# Patient Record
Sex: FEMALE | Race: WHITE | NOT HISPANIC OR LATINO | Employment: PART TIME | URBAN - METROPOLITAN AREA
[De-identification: names, ages, dates, MRNs, and addresses within clinical notes are randomized per-mention and may not be internally consistent; named-entity substitution may affect disease eponyms.]

---

## 2019-02-15 ENCOUNTER — CLINICAL SUPPORT (OUTPATIENT)
Dept: URGENT CARE | Facility: CLINIC | Age: 19
End: 2019-02-15
Payer: COMMERCIAL

## 2019-02-15 VITALS
DIASTOLIC BLOOD PRESSURE: 84 MMHG | BODY MASS INDEX: 30.76 KG/M2 | TEMPERATURE: 98 F | SYSTOLIC BLOOD PRESSURE: 121 MMHG | OXYGEN SATURATION: 99 % | RESPIRATION RATE: 12 BRPM | HEART RATE: 82 BPM | WEIGHT: 182 LBS

## 2019-02-15 DIAGNOSIS — H10.9 CONJUNCTIVITIS OF LEFT EYE, UNSPECIFIED CONJUNCTIVITIS TYPE: ICD-10-CM

## 2019-02-15 DIAGNOSIS — J06.9 UPPER RESPIRATORY TRACT INFECTION, UNSPECIFIED TYPE: ICD-10-CM

## 2019-02-15 DIAGNOSIS — J01.90 ACUTE NON-RECURRENT SINUSITIS, UNSPECIFIED LOCATION: Primary | ICD-10-CM

## 2019-02-15 PROCEDURE — 99204 PR OFFICE/OUTPT VISIT, NEW, LEVL IV, 45-59 MIN: ICD-10-PCS | Mod: S$GLB,,, | Performed by: NURSE PRACTITIONER

## 2019-02-15 PROCEDURE — 99204 OFFICE O/P NEW MOD 45 MIN: CPT | Mod: S$GLB,,, | Performed by: NURSE PRACTITIONER

## 2019-02-15 RX ORDER — PREDNISONE 20 MG/1
20 TABLET ORAL 2 TIMES DAILY
Qty: 10 TABLET | Refills: 0 | Status: SHIPPED | OUTPATIENT
Start: 2019-02-15 | End: 2019-02-20

## 2019-02-15 RX ORDER — PROMETHAZINE HYDROCHLORIDE AND DEXTROMETHORPHAN HYDROBROMIDE 6.25; 15 MG/5ML; MG/5ML
5 SYRUP ORAL EVERY 4 HOURS PRN
Qty: 240 ML | Refills: 0 | Status: SHIPPED | OUTPATIENT
Start: 2019-02-15 | End: 2019-02-25

## 2019-02-15 RX ORDER — FLUTICASONE PROPIONATE 50 MCG
2 SPRAY, SUSPENSION (ML) NASAL 2 TIMES DAILY
Qty: 1 BOTTLE | Refills: 0 | Status: SHIPPED | OUTPATIENT
Start: 2019-02-15 | End: 2020-03-02 | Stop reason: SDUPTHER

## 2019-02-15 RX ORDER — AZITHROMYCIN 250 MG/1
TABLET, FILM COATED ORAL
Qty: 6 TABLET | Refills: 0 | Status: SHIPPED | OUTPATIENT
Start: 2019-02-15 | End: 2019-09-06

## 2019-02-15 RX ORDER — POLYMYXIN B SULFATE AND TRIMETHOPRIM 1; 10000 MG/ML; [USP'U]/ML
1 SOLUTION OPHTHALMIC EVERY 6 HOURS
Qty: 10 ML | Refills: 0 | Status: SHIPPED | OUTPATIENT
Start: 2019-02-15 | End: 2019-09-06

## 2019-02-15 RX ORDER — CETIRIZINE HYDROCHLORIDE 10 MG/1
10 TABLET ORAL DAILY
Qty: 30 TABLET | Refills: 2 | Status: SHIPPED | OUTPATIENT
Start: 2019-02-15 | End: 2020-03-02 | Stop reason: SDUPTHER

## 2019-02-15 RX ORDER — BENZONATATE 100 MG/1
100 CAPSULE ORAL 3 TIMES DAILY PRN
Qty: 30 CAPSULE | Refills: 1 | Status: SHIPPED | OUTPATIENT
Start: 2019-02-15 | End: 2019-09-06

## 2019-02-15 RX ORDER — OLOPATADINE HYDROCHLORIDE 1 MG/ML
1 SOLUTION/ DROPS OPHTHALMIC 2 TIMES DAILY
Qty: 5 ML | Refills: 1 | Status: SHIPPED | OUTPATIENT
Start: 2019-02-15 | End: 2020-02-15

## 2019-02-15 NOTE — PROGRESS NOTES
Subjective:       Patient ID: Morena Paige is a 19 y.o. female.    Vitals:  weight is 82.6 kg (182 lb). Her oral temperature is 98.4 °F (36.9 °C). Her blood pressure is 121/84 and her pulse is 82. Her respiration is 12 and oxygen saturation is 99%.     Chief Complaint: Eye Problem    Eye Problem    The left eye is affected. This is a new problem. The current episode started yesterday. The problem occurs constantly. The pain is at a severity of 4/10. The patient is experiencing no pain. There is no known exposure to pink eye. Associated symptoms include an eye discharge and eye redness. Pertinent negatives include no blurred vision, double vision, fever, nausea or vomiting. Associated symptoms comments: Swelling, congestion ,and productive cough ( yellow) . She has tried nothing for the symptoms. The treatment provided no relief.       Constitution: Positive for fatigue. Negative for chills and fever.   HENT: Positive for congestion, postnasal drip, sinus pain, sinus pressure and sore throat.    Neck: Negative for painful lymph nodes.   Cardiovascular: Negative for chest pain and leg swelling.   Eyes: Positive for eye discharge and eye redness. Negative for double vision and blurred vision.   Respiratory: Positive for cough. Negative for shortness of breath.    Gastrointestinal: Negative for nausea, vomiting and diarrhea.   Genitourinary: Negative for dysuria, frequency, urgency and history of kidney stones.   Musculoskeletal: Negative for joint pain, joint swelling, muscle cramps and muscle ache.   Skin: Negative for color change, pale, rash and bruising.   Allergic/Immunologic: Negative for seasonal allergies.   Neurological: Negative for dizziness, history of vertigo, light-headedness, passing out and headaches.   Hematologic/Lymphatic: Negative for swollen lymph nodes.   Psychiatric/Behavioral: Negative for nervous/anxious, sleep disturbance and depression. The patient is not nervous/anxious.        Objective:       Physical Exam   Constitutional: She is oriented to person, place, and time. Vital signs are normal. She appears well-developed and well-nourished. She is cooperative.   HENT:   Head: Normocephalic.   Right Ear: Hearing, external ear and ear canal normal. A middle ear effusion is present.   Left Ear: Hearing, external ear and ear canal normal. A middle ear effusion is present.   Nose: Mucosal edema and rhinorrhea present. Right sinus exhibits maxillary sinus tenderness. Left sinus exhibits maxillary sinus tenderness.   Mouth/Throat: Uvula is midline and mucous membranes are normal. Posterior oropharyngeal erythema present.   Eyes: EOM and lids are normal. Pupils are equal, round, and reactive to light. Left conjunctiva is injected.   Neck: Trachea normal, normal range of motion, full passive range of motion without pain and phonation normal. Neck supple.   Cardiovascular: Normal rate, regular rhythm, normal heart sounds, intact distal pulses and normal pulses.   Pulmonary/Chest: Effort normal and breath sounds normal.   Abdominal: Soft. Normal appearance, normal aorta and bowel sounds are normal. There is no tenderness.   Musculoskeletal: Normal range of motion.   Neurological: She is alert and oriented to person, place, and time. She has normal strength. GCS eye subscore is 4. GCS verbal subscore is 5. GCS motor subscore is 6.   Skin: Skin is warm, dry and intact. Capillary refill takes less than 2 seconds.   Psychiatric: She has a normal mood and affect. Her speech is normal and behavior is normal. Judgment and thought content normal. Cognition and memory are normal.       Assessment:       1. Acute non-recurrent sinusitis, unspecified location    2. Upper respiratory tract infection, unspecified type    3. Conjunctivitis of left eye, unspecified conjunctivitis type        Plan:         Acute non-recurrent sinusitis, unspecified location    Upper respiratory tract infection, unspecified type    Conjunctivitis  of left eye, unspecified conjunctivitis type    Other orders  -     olopatadine (PATANOL) 0.1 % ophthalmic solution; Place 1 drop into both eyes 2 (two) times daily.  Dispense: 5 mL; Refill: 1  -     polymyxin B sulf-trimethoprim (POLYTRIM) 10,000 unit- 1 mg/mL Drop; Place 1 drop into the left eye every 6 (six) hours.  Dispense: 10 mL; Refill: 0  -     cetirizine (ZYRTEC) 10 MG tablet; Take 1 tablet (10 mg total) by mouth once daily.  Dispense: 30 tablet; Refill: 2  -     fluticasone (FLONASE) 50 mcg/actuation nasal spray; 2 sprays (100 mcg total) by Each Nare route 2 (two) times daily.  Dispense: 1 Bottle; Refill: 0  -     predniSONE (DELTASONE) 20 MG tablet; Take 1 tablet (20 mg total) by mouth 2 (two) times daily. for 5 days  Dispense: 10 tablet; Refill: 0  -     promethazine-dextromethorphan (PROMETHAZINE-DM) 6.25-15 mg/5 mL Syrp; Take 5 mLs by mouth every 4 (four) hours as needed.  Dispense: 240 mL; Refill: 0  -     benzonatate (TESSALON PERLES) 100 MG capsule; Take 1 capsule (100 mg total) by mouth 3 (three) times daily as needed for Cough.  Dispense: 30 capsule; Refill: 1  -     azithromycin (Z-ALEKS) 250 MG tablet; Take two tablets on day one by mouth, then one tablet by mouth on days 2-5  Dispense: 6 tablet; Refill: 0

## 2019-04-17 PROBLEM — Z34.90 ENCOUNTER FOR INDUCTION OF LABOR: Status: ACTIVE | Noted: 2019-04-17

## 2019-09-06 ENCOUNTER — OFFICE VISIT (OUTPATIENT)
Dept: FAMILY MEDICINE | Facility: CLINIC | Age: 19
End: 2019-09-06
Payer: COMMERCIAL

## 2019-09-06 VITALS
SYSTOLIC BLOOD PRESSURE: 118 MMHG | OXYGEN SATURATION: 98 % | TEMPERATURE: 98 F | WEIGHT: 188.5 LBS | BODY MASS INDEX: 28.57 KG/M2 | HEIGHT: 68 IN | DIASTOLIC BLOOD PRESSURE: 74 MMHG | HEART RATE: 62 BPM

## 2019-09-06 DIAGNOSIS — Z00.00 ANNUAL PHYSICAL EXAM: Primary | ICD-10-CM

## 2019-09-06 DIAGNOSIS — Z28.39 IMMUNIZATION DEFICIENCY: ICD-10-CM

## 2019-09-06 DIAGNOSIS — Z12.4 CERVICAL CANCER SCREENING: ICD-10-CM

## 2019-09-06 DIAGNOSIS — Z76.89 ENCOUNTER TO ESTABLISH CARE: ICD-10-CM

## 2019-09-06 PROBLEM — Z34.90 ENCOUNTER FOR INDUCTION OF LABOR: Status: RESOLVED | Noted: 2019-04-17 | Resolved: 2019-09-06

## 2019-09-06 PROCEDURE — 99385 PREV VISIT NEW AGE 18-39: CPT | Mod: 25,S$GLB,, | Performed by: NURSE PRACTITIONER

## 2019-09-06 PROCEDURE — 99385 PR PREVENTIVE VISIT,NEW,18-39: ICD-10-PCS | Mod: 25,S$GLB,, | Performed by: NURSE PRACTITIONER

## 2019-09-06 PROCEDURE — 90471 TDAP VACCINE GREATER THAN OR EQUAL TO 7YO IM: ICD-10-PCS | Mod: S$GLB,,, | Performed by: NURSE PRACTITIONER

## 2019-09-06 PROCEDURE — 90471 IMMUNIZATION ADMIN: CPT | Mod: S$GLB,,, | Performed by: NURSE PRACTITIONER

## 2019-09-06 PROCEDURE — 90715 TDAP VACCINE GREATER THAN OR EQUAL TO 7YO IM: ICD-10-PCS | Mod: S$GLB,,, | Performed by: NURSE PRACTITIONER

## 2019-09-06 PROCEDURE — 90472 IMMUNIZATION ADMIN EACH ADD: CPT | Mod: S$GLB,,, | Performed by: NURSE PRACTITIONER

## 2019-09-06 PROCEDURE — 90715 TDAP VACCINE 7 YRS/> IM: CPT | Mod: S$GLB,,, | Performed by: NURSE PRACTITIONER

## 2019-09-06 PROCEDURE — 90472 MENINGOCOCCAL CONJUGATE VACCINE 4-VALENT IM (MENACTRA): ICD-10-PCS | Mod: S$GLB,,, | Performed by: NURSE PRACTITIONER

## 2019-09-06 PROCEDURE — 90734 MENACWYD/MENACWYCRM VACC IM: CPT | Mod: S$GLB,,, | Performed by: NURSE PRACTITIONER

## 2019-09-06 PROCEDURE — 99999 PR PBB SHADOW E&M-EST. PATIENT-LVL III: CPT | Mod: PBBFAC,,, | Performed by: NURSE PRACTITIONER

## 2019-09-06 PROCEDURE — 99999 PR PBB SHADOW E&M-EST. PATIENT-LVL III: ICD-10-PCS | Mod: PBBFAC,,, | Performed by: NURSE PRACTITIONER

## 2019-09-06 PROCEDURE — 90734 MENINGOCOCCAL CONJUGATE VACCINE 4-VALENT IM (MENACTRA): ICD-10-PCS | Mod: S$GLB,,, | Performed by: NURSE PRACTITIONER

## 2019-09-06 NOTE — PROGRESS NOTES
Patient verified by name and . Patient received tDap in left deltoid and Menactra meningococcal meningitis in right deltoid. Patient tolerated injection well. Patient advised to wait in clinic for 15 minutes in case of adverse reactions. Patient demonstrated understanding.

## 2019-09-06 NOTE — PROGRESS NOTES
Subjective:       Patient ID: Morena Paige is a 19 y.o. female.    Chief Complaint: Immunizations    Ms. Paige is a 18 yo female patient who presents today to establish care and to discuss needed vaccines for school. Last saw MD in Pediatrics, Dr. Castillo. She is studying elementary education at [employer], needs Tdap and Meningococcal vaccines. She is otherwise feeling well. No complaints today. Hx of cyclical vomiting syndrome, not currently experiencing.     Medical/Surgical/Family/Social history and medications reviewed, updated.     Requesting referral to new GYN. Took birth control over the last year, caused weight gain. Approx 155 about a year ago. Works as a , but no regular exercise when she is off. No diet changes. Vitals stable.     Patient Active Problem List   Diagnosis    Cyclic vomiting syndrome    Encounter for induction of labor       Current Outpatient Medications:     cetirizine (ZYRTEC) 10 MG tablet, Take 1 tablet (10 mg total) by mouth once daily., Disp: 30 tablet, Rfl: 2    fluticasone (FLONASE) 50 mcg/actuation nasal spray, 2 sprays (100 mcg total) by Each Nare route 2 (two) times daily., Disp: 1 Bottle, Rfl: 0    olopatadine (PATANOL) 0.1 % ophthalmic solution, Place 1 drop into both eyes 2 (two) times daily., Disp: 5 mL, Rfl: 1    desonide (DESOWEN) 0.05 % cream, Apply to affected area 2 times daily, Disp: 60 g, Rfl: 1    Review of Systems   Constitutional: Positive for unexpected weight change. Negative for activity change.   HENT: Negative for hearing loss, rhinorrhea and trouble swallowing.    Eyes: Negative for discharge and visual disturbance.   Respiratory: Negative for chest tightness and wheezing.    Cardiovascular: Negative for chest pain and palpitations.   Gastrointestinal: Negative for blood in stool, constipation, diarrhea and vomiting.   Endocrine: Negative for polydipsia and polyuria.   Genitourinary: Negative for difficulty urinating, dysuria, hematuria and  menstrual problem.   Musculoskeletal: Negative for arthralgias, joint swelling and neck pain.   Neurological: Negative for weakness and headaches.   Psychiatric/Behavioral: Negative for confusion and dysphoric mood.       Objective:      Physical Exam   Constitutional: She is oriented to person, place, and time. Vital signs are normal. She appears well-developed and well-nourished. No distress.   HENT:   Right Ear: Tympanic membrane, external ear and ear canal normal.   Left Ear: Tympanic membrane, external ear and ear canal normal.   Nose: No mucosal edema.   Mouth/Throat: Oropharynx is clear and moist.   Eyes: Pupils are equal, round, and reactive to light. EOM are normal.   Neck: Neck supple. No thyromegaly present.   Cardiovascular: Normal rate, regular rhythm and normal heart sounds.   Pulmonary/Chest: Effort normal and breath sounds normal. She has no wheezes.   Abdominal: Soft. Normal appearance and bowel sounds are normal. There is no tenderness.   Musculoskeletal: She exhibits no edema.   Neurological: She is alert and oriented to person, place, and time.   Skin: Skin is warm and dry.   Psychiatric: She has a normal mood and affect.   Nursing note and vitals reviewed.      Assessment:       1. Annual physical exam    2. Encounter to establish care    3. Immunization deficiency    4. Cervical cancer screening    5. BMI 28.0-28.9,adult        Plan:       Morena was seen today for immunizations.    Diagnoses and all orders for this visit:    Annual physical exam  -     CBC auto differential; Future  -     Comprehensive metabolic panel; Future  -     Lipid panel; Future  Discussed health maintenance guidelines appropriate for age.    Screen and treat as needed    Encounter to establish care  Patient to return in 1 year to establish with PCP    Immunization deficiency  -     (In Office Administered) Tdap Vaccine  -     (In Office Administered) Meningococcal Conjugate - MCV4P (MENACTRA)  Immunize today.  Counseled  "patient on risks, benefits and side effects.  Patient elected to proceed with vaccination.    Cervical cancer screening  -     Ambulatory referral to Obstetrics / Gynecology    BMI 28.0-28.9,adult  Declines weight loss workup today, believes it was related to hormonal contraceptives  Counseled patient on his ideal body weight, health consequences of being obese and current recommendations including weekly exercise and a heart healthy diet.  Current BMI is:Estimated body mass index is 28.66 kg/m² as calculated from the following:    Height as of this encounter: 5' 8" (1.727 m).    Weight as of this encounter: 85.5 kg (188 lb 7.9 oz)..  Patient is aware that ideal BMI < 25 or Weight in (lb) to have BMI = 25: 164.1.          "

## 2020-03-02 ENCOUNTER — CLINICAL SUPPORT (OUTPATIENT)
Dept: URGENT CARE | Facility: CLINIC | Age: 20
End: 2020-03-02
Payer: COMMERCIAL

## 2020-03-02 VITALS
TEMPERATURE: 97 F | SYSTOLIC BLOOD PRESSURE: 114 MMHG | WEIGHT: 189 LBS | DIASTOLIC BLOOD PRESSURE: 77 MMHG | HEART RATE: 79 BPM | HEIGHT: 64 IN | OXYGEN SATURATION: 98 % | BODY MASS INDEX: 32.27 KG/M2

## 2020-03-02 DIAGNOSIS — R19.7 DIARRHEA, UNSPECIFIED TYPE: ICD-10-CM

## 2020-03-02 DIAGNOSIS — J06.9 UPPER RESPIRATORY TRACT INFECTION, UNSPECIFIED TYPE: Primary | ICD-10-CM

## 2020-03-02 PROCEDURE — 99214 PR OFFICE/OUTPT VISIT, EST, LEVL IV, 30-39 MIN: ICD-10-PCS | Mod: S$GLB,,, | Performed by: NURSE PRACTITIONER

## 2020-03-02 PROCEDURE — 99214 OFFICE O/P EST MOD 30 MIN: CPT | Mod: S$GLB,,, | Performed by: NURSE PRACTITIONER

## 2020-03-02 RX ORDER — CETIRIZINE HYDROCHLORIDE 10 MG/1
10 TABLET ORAL DAILY
Qty: 90 TABLET | Refills: 3 | Status: SHIPPED | OUTPATIENT
Start: 2020-03-02 | End: 2021-03-02

## 2020-03-02 RX ORDER — GUAIFENESIN 1200 MG/1
1200 TABLET, EXTENDED RELEASE ORAL 2 TIMES DAILY
Qty: 20 TABLET | Refills: 0 | Status: SHIPPED | OUTPATIENT
Start: 2020-03-02 | End: 2020-03-12

## 2020-03-02 RX ORDER — METHYLPREDNISOLONE 4 MG/1
TABLET ORAL
Qty: 1 PACKAGE | Refills: 0 | Status: SHIPPED | OUTPATIENT
Start: 2020-03-02 | End: 2020-03-23

## 2020-03-02 RX ORDER — FLUTICASONE PROPIONATE 50 MCG
2 SPRAY, SUSPENSION (ML) NASAL 2 TIMES DAILY
Qty: 16 G | Refills: 1 | Status: SHIPPED | OUTPATIENT
Start: 2020-03-02

## 2020-03-02 NOTE — PATIENT INSTRUCTIONS
Uncertain Causes of Diarrhea (Adult)    Diarrhea is when stools are loose and watery. This can be caused by:  · Viral infections  · Bacterial infections  · Food poisoning  · Parasites  · Irritable bowel syndrome (IBS)  · Inflammatory bowel diseases such as ulcerative colitis, Crohn's disease, and celiac disease  · Food intolerance, such as to lactose, the sugar found in milk and milk products  · Reaction to medicines like antibiotics, laxatives, cancer drugs, and antacids  Along with diarrhea, you may also have:  · Abdominal pain and cramping  · Nausea and vomiting  · Loss of bowel control  · Fever and chills  · Bloody stools  In some cases, antibiotics may help to treat diarrhea. You may have a stool sample test. This is done to see what is causing your diarrhea, and if antibiotics will help treat it. The results of a stool sample test may take up to 2 days. The healthcare provider may not give you antibiotics until he or she has the stool test results.  Diarrhea can cause dehydration. This is the loss of too much water and other fluids from the body. When this occurs, body fluid must be replaced. This can be done with oral rehydration solutions. Oral rehydration solutions are available at drugstores and grocery stores without a prescription.  Home care  Follow all instructions given by your healthcare provider. Rest at home for the next 24 hours, or until you feel better. Avoid caffeine, tobacco, and alcohol. These can make diarrhea, cramping, and pain worse.  If taking medicines:  · Dont take over-the-counter diarrhea or nausea medicines unless your healthcare provider tells you to.  · You may use acetaminophen or NSAID medicines like ibuprofen or naproxen to reduce pain and fever. Dont use these if you have chronic liver or kidney disease, or ever had a stomach ulcer or gastrointestinal bleeding. Don't use NSAID medicines if you are already taking one for another condition (like arthritis) or are on daily  aspirin therapy (such as for heart disease or after a stroke). Talk with your healthcare provider first.  · If antibiotics were prescribed, be sure you take them until they are finished. Dont stop taking them even when you feel better. Antibiotics must be taken as a full course.  To prevent the spread of illness:  · Remember that washing with soap and water and using alcohol-based  is the best way to prevent the spread of infection.  · Clean the toilet after each use.  · Wash your hands before eating.  · Wash your hands before and after preparing food. Keep in mind that people with diarrhea or vomiting should not prepare food for others.  · Wash your hands after using cutting boards, countertops, and knives that have been in contact with raw foods.  · Wash and then peel fruits and vegetables.  · Keep uncooked meats away from cooked and ready-to-eat foods.  · Use a food thermometer when cooking. Cook poultry to at least 165°F (74°C). Cook ground meat (beef, veal, pork, lamb) to at least 160°F (71°C). Cook fresh beef, veal, lamb, and pork to at least 145°F (63°C).  · Dont eat raw or undercooked eggs (poached or adele side up), poultry, meat, or unpasteurized milk and juices.  Food and drinks  The main goal while treating vomiting or diarrhea is to prevent dehydration. This is done by taking small amounts of liquids often.  · Keep in mind that liquids are more important than food right now.  · Drink only small amounts of liquids at a time.  · Dont force yourself to eat, especially if you are having cramping, vomiting, or diarrhea. Dont eat large amounts at a time, even if you are hungry.  · If you eat, avoid fatty, greasy, spicy, or fried foods.  · Dont eat dairy foods or drink milk if you have diarrhea. These can make diarrhea worse.  During the first 24 hours you can try:  · Oral rehydration solutions. Do not use sports drinks. They have too much sugar and not enough electrolytes.  · Soft drinks without  caffeine  · Ginger ale  · Water (plain or flavored)  · Decaf tea or coffee  · Clear broth, consommé, or bouillon  · Gelatin, popsicles, or frozen fruit juice bars  The second 24 hours, if you are feeling better, you can add:  · Hot cereal, plain toast, bread, rolls, or crackers  · Plain noodles, rice, mashed potatoes, chicken noodle soup, or rice soup  · Unsweetened canned fruit (no pineapple)  · Bananas  As you recover:  · Limit fat intake to less than 15 grams per day. Dont eat margarine, butter, oils, mayonnaise, sauces, gravies, fried foods, peanut butter, meat, poultry, or fish.  · Limit fiber. Dont eat raw or cooked vegetables, fresh fruits except bananas, or bran cereals.  · Limit caffeine and chocolate.  · Limit dairy.  · Dont use spices or seasonings except salt.  · Go back to your normal diet over time, as you feel better and your symptoms improve.  · If the symptoms come back, go back to a simple diet or clear liquids.  Follow-up care  Follow up with your healthcare provider, or as advised. If a stool sample was taken or cultures were done, call the healthcare provider for the results as instructed.  Call 911  Call 911 if you have any of these symptoms:  · Trouble breathing  · Confusion  · Extreme drowsiness or trouble walking  · Loss of consciousness  · Rapid heart rate  · Chest pain  · Stiff neck  · Seizure  When to seek medical advice  Call your healthcare provider right away if any of these occur:  · Abdominal pain that gets worse  · Constant lower right abdominal pain  · Continued vomiting and inability to keep liquids down  · Diarrhea more than 5 times a day  · Blood in vomit or stool  · Dark urine or no urine for 8 hours, dry mouth and tongue, tiredness, weakness, or dizziness  · Drowsiness  · New rash  · You dont get better in 2 to 3 days  · Fever of 100.4°F (38°C) or higher that doesnt get lower with medicine  Date Last Reviewed: 1/3/2016  © 3163-6641 [employer]. [address] [place] [address]  [place] [address]. All rights reserved. This information is not intended as a substitute for professional medical care. Always follow your healthcare professional's instructions.        Low-Fiber Diet     Eggs are high in protein and easy to digest.     Eating a low-fiber diet means eating foods that dont have much fiber. These foods are easy to digest.  Most of the fiber that you eat passes undigested through your bowel. This is what forms stool. Low-fiber foods can help to slow down your bowel movements. When you eat a low-fiber diet, you have fewer stools. This lets your intestine rest.  Your healthcare provider will tell you how long you need to be on this diet. It may only be for a short time. Low-fiber foods often don't give you all the nutrients you need to keep healthy. Your provider may have you take certain vitamins while you are on this diet.  Reasons to eat a low-fiber diet  The goal of a low-fiber diet is to limit the size and number of your stools. It may be prescribed if you:  · Are having chemotherapy or radiation treatments  · Have had intestinal surgery  · Have a condition that affects your intestine, such as irritable bowel syndrome, Crohns disease, ulcerative colitis, or diverticulitis  General guidelines for a low-fiber diet  In general, a low-fiber diet means having fewer than 13 grams of fiber a day. Your provider may give you a list of things you can and cant eat or drink. Read food labels. Choose foods and drinks that have as close to zero grams of fiber as possible. Here are general guidelines to follow:  Breads, pasta, cereal, rice, and other starches (6 to 11 servings daily)  · What to choose: white bread, biscuits, muffins, and white rolls; plain crackers; waffles; white pasta; white rice; cream of wheat; grits; white pancakes; corn flakes; cooked potatoes without skin.  Fiber content of these foods should be less than 0.5 (1/2) gram per serving.  · What to avoid:  whole-wheat or whole-grain breads, crackers, and pasta; breads with seeds or nuts; wheat germ; darlene crackers; cornbread; wild or brown rice; whole-grain, bran, and granola cereals; cereals with seeds, nuts, coconut, or dried fruit; potatoes with skin  Milk and dairy (2 servings daily)  · What to choose: milk, buttermilk; yogurt or ice cream without seeds or nuts; custard or pudding; sour cream; cheese and cottage cheese  · What to avoid: ice cream and yogurt with seeds, nuts, or fruit chunks  Fruit (2 to 4 servings daily)  · What to choose: ripe banana; ripe nectarine, peach, apricot, papaya, and plum; soft honeydew melon and cantaloupe; cooked or canned fruit without skin or seeds (not sweetened with sorbitol); applesauce; strained fruit juice (without pulp)  · What to avoid: raw or dried fruit; all berries; raisins; canned and raw pineapple; prunes and prune juice; fruit juice with pulp  Vegetables (3 to 5 servings daily)  · What to choose: well-cooked or canned vegetables without seeds, such as spinach, eggplant, green and wax beans, carrots, yellow squash, pumpkin; lettuce on a sandwich  · What to avoid: all raw or steamed vegetables; vegetables with seeds, such as unstrained tomato sauce; green peas; lima beans; broccoli; corn; parsnips  Meats and protein (4 to 6 ounces daily)  · What to choose: tender, well-cooked meat, including ground meat, poultry, and fish; eggs; tofu; creamy peanut butter  · What to avoid: tough, chewy meat with gristle; peas, including split, yellow, and black-eyed; beans, including navy, lima, black, garbanzo, soy, bonilla, and lentil; peanuts and crunchy peanut butter   Fats, oils, sauces, condiments (fewer than 8 teaspoons daily)  · What to choose: butter, magarine, oils, whipped cream, sour cream, mayonnaise, smooth dressings and sauces; plain gravy; smooth condiments  · What to avoid: dressing with seeds or fruit chunks; pickles and relishes  Other foods and drinks  · What to  choose: water; plain gelatin; plain puddings; pretzels; plain cookies and cakes; honey, syrup; decaffeinated drinks, including tea and coffee    · What to avoid: popcorn; potato chips; spicy foods; fried, greasy foods; alcohol (ask your provider); marmalade, jam, and preserves; desserts that have seeds, nuts, coconut, dried fruit, whole grains or bran; candy that has seeds or nuts; drinks sweetened with sorbitol or other sugar substitutes; caffeinated drinks, including tea, coffee, soda, and energy drinks  Date Last Reviewed: 6/18/2015 © 2000-2017 [employer]. [address] [place] [address], [place] [address]. All rights reserved. This information is not intended as a substitute for professional medical care. Always follow your healthcare professional's instructions.        Treating Diarrhea    Diarrhea happens when you have loose, watery, or frequent bowel movements. It is a common problem with many causes. Most cases of diarrhea clear up on their own. But certain cases may need treatment. Be sure to see your healthcare provider if your symptoms do not improve within a few days.  Getting relief  Treatment of diarrhea depends on its cause. Diarrhea caused by bacterial or parasite infection is often treated with antibiotics. Diarrhea caused by other factors, such as a stomach virus, often improves with simple home treatment. The tips below may also help relieve your symptoms.  · Drink plenty of fluids. This helps prevent too much fluid loss (dehydration). Water, clear soups, and electrolyte solutions are good choices. Avoid alcohol, coffee, tea, and milk. These can irritate your intestines and make symptoms worse.  · Suck on ice chips if drinking makes you queasy.  · Return to your normal diet slowly. You may want to eat bland foods at first, such as rice and toast. Also, you may need to avoid certain foods for a while, such as dairy products. These can make symptoms worse. Ask your healthcare provider if  there are any other foods you should avoid.  · If you were prescribed antibiotics, take them as directed.  · Do not take anti-diarrhea medicines without asking your healthcare provider first.  Call your healthcare provider   Call your healthcare provider if you have any of the following:   · A fever of 100.4°F (38.0°C) or higher, or as directed by your healthcare provider  · Severe pain  · Worsening diarrhea or diarrhea for more than 2 days  · Bloody vomit or stool  · Signs of dehydration (dizziness, dry mouth and tongue, rapid pulse, dark urine)  Date Last Reviewed: 7/1/2016  © 1167-0098 [employer]. [address] [place] [address], [place] [address]. All rights reserved. This information is not intended as a substitute for professional medical care. Always follow your healthcare professional's instructions.

## 2020-03-02 NOTE — PROGRESS NOTES
"Subjective:       Patient ID: Morena Paige is a 20 y.o. female.    Vitals:  height is 5' 4" (1.626 m) and weight is 85.7 kg (189 lb). Her oral temperature is 97.4 °F (36.3 °C). Her blood pressure is 114/77 and her pulse is 79. Her oxygen saturation is 98%.     Chief Complaint: Sore Throat    Sore Throat    This is a new problem. The current episode started yesterday. The problem has been gradually worsening. Neither side of throat is experiencing more pain than the other. Associated symptoms include abdominal pain, congestion, coughing, diarrhea, headaches and neck pain. Pertinent negatives include no ear pain, shortness of breath, stridor or vomiting. Associated symptoms comments: Nausea  Chills/sweats  . She has tried nothing for the symptoms.       Constitution: Positive for chills and fatigue. Negative for sweating and fever.   HENT: Positive for congestion, postnasal drip, sinus pressure and sore throat. Negative for ear pain, sinus pain and voice change.    Neck: Positive for neck pain. Negative for painful lymph nodes.   Eyes: Negative for eye redness.   Respiratory: Positive for cough and sputum production. Negative for chest tightness, shortness of breath, stridor and wheezing.    Gastrointestinal: Positive for abdominal pain and diarrhea. Negative for nausea and vomiting.   Musculoskeletal: Negative for muscle ache.   Skin: Negative for rash.   Allergic/Immunologic: Negative for seasonal allergies.   Neurological: Positive for headaches. Negative for dizziness and light-headedness.   Hematologic/Lymphatic: Negative for swollen lymph nodes.       Objective:      Physical Exam   Constitutional: She is oriented to person, place, and time. She appears well-developed and well-nourished.   HENT:   Head: Normocephalic and atraumatic.   Right Ear: External ear normal. A middle ear effusion is present.   Left Ear: External ear normal. A middle ear effusion is present.   Nose: Mucosal edema present. Right sinus " exhibits frontal sinus tenderness. Left sinus exhibits frontal sinus tenderness.   Mouth/Throat: Mucous membranes are normal. Posterior oropharyngeal erythema and cobblestoning present.   Eyes: Conjunctivae and lids are normal.   Neck: Trachea normal and full passive range of motion without pain. Neck supple.   Cardiovascular: Normal rate, regular rhythm and normal heart sounds.   Pulmonary/Chest: Effort normal and breath sounds normal. No respiratory distress.   Abdominal: Soft. Normal appearance. She exhibits no distension, no abdominal bruit, no pulsatile midline mass and no mass. Bowel sounds are increased. There is tenderness in the epigastric area.   Musculoskeletal: She exhibits no edema.   Neurological: She is alert and oriented to person, place, and time. She has normal strength.   Skin: Skin is warm, dry, intact, not diaphoretic and not pale.   Psychiatric: She has a normal mood and affect. Her speech is normal and behavior is normal. Judgment and thought content normal. Cognition and memory are normal.   Nursing note and vitals reviewed.        Assessment:       1. Upper respiratory tract infection, unspecified type    2. Diarrhea, unspecified type        Plan:         Upper respiratory tract infection, unspecified type  -     methylPREDNISolone (MEDROL DOSEPACK) 4 mg tablet; use as directed  Dispense: 1 Package; Refill: 0  -     guaiFENesin 1,200 mg Ta12; Take 1,200 mg by mouth 2 (two) times daily. for 10 days  Dispense: 20 tablet; Refill: 0  -     cetirizine (ZYRTEC) 10 MG tablet; Take 1 tablet (10 mg total) by mouth once daily.  Dispense: 90 tablet; Refill: 3  -     fluticasone propionate (FLONASE) 50 mcg/actuation nasal spray; 2 sprays (100 mcg total) by Each Nostril route 2 (two) times daily.  Dispense: 16 g; Refill: 1    Diarrhea, unspecified type      - discussed CLDAAT, increase fluid intake, probiotics, imodium if no improvements in 2-3 days

## 2021-03-26 ENCOUNTER — OFFICE VISIT (OUTPATIENT)
Dept: OBSTETRICS AND GYNECOLOGY | Facility: CLINIC | Age: 21
End: 2021-03-26
Payer: COMMERCIAL

## 2021-03-26 VITALS — HEIGHT: 64 IN | BODY MASS INDEX: 34.92 KG/M2 | WEIGHT: 204.56 LBS

## 2021-03-26 DIAGNOSIS — Z12.4 PAP SMEAR FOR CERVICAL CANCER SCREENING: Primary | ICD-10-CM

## 2021-03-26 DIAGNOSIS — N91.2 AMENORRHEA: ICD-10-CM

## 2021-03-26 PROCEDURE — 1126F AMNT PAIN NOTED NONE PRSNT: CPT | Mod: S$GLB,,, | Performed by: SPECIALIST

## 2021-03-26 PROCEDURE — 88175 CYTOPATH C/V AUTO FLUID REDO: CPT | Performed by: SPECIALIST

## 2021-03-26 PROCEDURE — 1126F PR PAIN SEVERITY QUANTIFIED, NO PAIN PRESENT: ICD-10-PCS | Mod: S$GLB,,, | Performed by: SPECIALIST

## 2021-03-26 PROCEDURE — 99999 PR PBB SHADOW E&M-EST. PATIENT-LVL III: CPT | Mod: PBBFAC,,, | Performed by: SPECIALIST

## 2021-03-26 PROCEDURE — 99204 PR OFFICE/OUTPT VISIT, NEW, LEVL IV, 45-59 MIN: ICD-10-PCS | Mod: S$GLB,,, | Performed by: SPECIALIST

## 2021-03-26 PROCEDURE — 3008F BODY MASS INDEX DOCD: CPT | Mod: CPTII,S$GLB,, | Performed by: SPECIALIST

## 2021-03-26 PROCEDURE — 99999 PR PBB SHADOW E&M-EST. PATIENT-LVL III: ICD-10-PCS | Mod: PBBFAC,,, | Performed by: SPECIALIST

## 2021-03-26 PROCEDURE — 3008F PR BODY MASS INDEX (BMI) DOCUMENTED: ICD-10-PCS | Mod: CPTII,S$GLB,, | Performed by: SPECIALIST

## 2021-03-26 PROCEDURE — 99204 OFFICE O/P NEW MOD 45 MIN: CPT | Mod: S$GLB,,, | Performed by: SPECIALIST

## 2021-03-30 LAB
FINAL PATHOLOGIC DIAGNOSIS: NORMAL
Lab: NORMAL

## 2021-04-12 ENCOUNTER — HOSPITAL ENCOUNTER (OUTPATIENT)
Dept: RADIOLOGY | Facility: HOSPITAL | Age: 21
Discharge: HOME | End: 2021-04-12
Attending: SPECIALIST
Payer: COMMERCIAL

## 2021-04-12 DIAGNOSIS — N91.2 AMENORRHEA: ICD-10-CM

## 2021-04-12 PROCEDURE — 76856 US PELVIS COMPLETE NON OB: ICD-10-PCS | Mod: 26,,, | Performed by: RADIOLOGY

## 2021-04-12 PROCEDURE — 76856 US EXAM PELVIC COMPLETE: CPT | Mod: TC

## 2021-04-12 PROCEDURE — 76856 US EXAM PELVIC COMPLETE: CPT | Mod: 26,,, | Performed by: RADIOLOGY

## 2021-04-16 ENCOUNTER — OFFICE VISIT (OUTPATIENT)
Dept: OBSTETRICS AND GYNECOLOGY | Facility: CLINIC | Age: 21
End: 2021-04-16
Payer: COMMERCIAL

## 2021-04-16 VITALS
DIASTOLIC BLOOD PRESSURE: 72 MMHG | BODY MASS INDEX: 35.49 KG/M2 | WEIGHT: 207.88 LBS | HEIGHT: 64 IN | RESPIRATION RATE: 18 BRPM | SYSTOLIC BLOOD PRESSURE: 110 MMHG

## 2021-04-16 DIAGNOSIS — E28.2 PCOS (POLYCYSTIC OVARIAN SYNDROME): ICD-10-CM

## 2021-04-16 DIAGNOSIS — N91.2 AMENORRHEA: Primary | ICD-10-CM

## 2021-04-16 DIAGNOSIS — E23.7 DISORDER OF PITUITARY GLAND, UNSPECIFIED: ICD-10-CM

## 2021-04-16 PROCEDURE — 99213 PR OFFICE/OUTPT VISIT, EST, LEVL III, 20-29 MIN: ICD-10-PCS | Mod: S$GLB,,, | Performed by: SPECIALIST

## 2021-04-16 PROCEDURE — 99213 OFFICE O/P EST LOW 20 MIN: CPT | Mod: S$GLB,,, | Performed by: SPECIALIST

## 2021-04-16 PROCEDURE — 99999 PR PBB SHADOW E&M-EST. PATIENT-LVL III: ICD-10-PCS | Mod: PBBFAC,,, | Performed by: SPECIALIST

## 2021-04-16 PROCEDURE — 1126F AMNT PAIN NOTED NONE PRSNT: CPT | Mod: S$GLB,,, | Performed by: SPECIALIST

## 2021-04-16 PROCEDURE — 99999 PR PBB SHADOW E&M-EST. PATIENT-LVL III: CPT | Mod: PBBFAC,,, | Performed by: SPECIALIST

## 2021-04-16 PROCEDURE — 1126F PR PAIN SEVERITY QUANTIFIED, NO PAIN PRESENT: ICD-10-PCS | Mod: S$GLB,,, | Performed by: SPECIALIST

## 2021-04-16 PROCEDURE — 3008F PR BODY MASS INDEX (BMI) DOCUMENTED: ICD-10-PCS | Mod: CPTII,S$GLB,, | Performed by: SPECIALIST

## 2021-04-16 PROCEDURE — 3008F BODY MASS INDEX DOCD: CPT | Mod: CPTII,S$GLB,, | Performed by: SPECIALIST

## 2021-04-16 RX ORDER — METFORMIN HYDROCHLORIDE 750 MG/1
750 TABLET, EXTENDED RELEASE ORAL
Qty: 30 TABLET | Refills: 11 | Status: SHIPPED | OUTPATIENT
Start: 2021-04-16 | End: 2022-04-16

## 2021-04-16 RX ORDER — NORETHINDRONE ACETATE AND ETHINYL ESTRADIOL .02; 1 MG/1; MG/1
1 TABLET ORAL DAILY
Qty: 30 TABLET | Refills: 11 | Status: SHIPPED | OUTPATIENT
Start: 2021-04-16 | End: 2021-05-24

## 2021-04-29 ENCOUNTER — PATIENT MESSAGE (OUTPATIENT)
Dept: RESEARCH | Facility: HOSPITAL | Age: 21
End: 2021-04-29

## 2021-05-17 ENCOUNTER — HOSPITAL ENCOUNTER (OUTPATIENT)
Dept: RADIOLOGY | Facility: HOSPITAL | Age: 21
Discharge: HOME | End: 2021-05-17
Attending: SPECIALIST
Payer: COMMERCIAL

## 2021-05-17 DIAGNOSIS — E23.7 DISORDER OF PITUITARY GLAND, UNSPECIFIED: ICD-10-CM

## 2021-05-17 PROCEDURE — 70470 CT HEAD/BRAIN W/O & W/DYE: CPT | Mod: TC

## 2021-05-17 PROCEDURE — 70470 CT HEAD/BRAIN W/O & W/DYE: CPT | Mod: 26,,, | Performed by: RADIOLOGY

## 2021-05-17 PROCEDURE — 70470 CT HEAD WITH AND WITHOUT: ICD-10-PCS | Mod: 26,,, | Performed by: RADIOLOGY

## 2021-05-17 PROCEDURE — 25500020 PHARM REV CODE 255

## 2021-05-17 RX ADMIN — IOHEXOL 75 ML: 350 INJECTION, SOLUTION INTRAVENOUS at 01:05

## 2021-05-19 ENCOUNTER — OFFICE VISIT (OUTPATIENT)
Dept: OBSTETRICS AND GYNECOLOGY | Facility: CLINIC | Age: 21
End: 2021-05-19
Payer: COMMERCIAL

## 2021-05-19 VITALS
DIASTOLIC BLOOD PRESSURE: 70 MMHG | SYSTOLIC BLOOD PRESSURE: 118 MMHG | BODY MASS INDEX: 35.04 KG/M2 | WEIGHT: 205.25 LBS | HEIGHT: 64 IN | RESPIRATION RATE: 16 BRPM

## 2021-05-19 DIAGNOSIS — E28.2 PCOS (POLYCYSTIC OVARIAN SYNDROME): Primary | ICD-10-CM

## 2021-05-19 PROCEDURE — 99999 PR PBB SHADOW E&M-EST. PATIENT-LVL III: ICD-10-PCS | Mod: PBBFAC,,, | Performed by: SPECIALIST

## 2021-05-19 PROCEDURE — 99213 OFFICE O/P EST LOW 20 MIN: CPT | Mod: S$GLB,,, | Performed by: SPECIALIST

## 2021-05-19 PROCEDURE — 1126F PR PAIN SEVERITY QUANTIFIED, NO PAIN PRESENT: ICD-10-PCS | Mod: S$GLB,,, | Performed by: SPECIALIST

## 2021-05-19 PROCEDURE — 3008F BODY MASS INDEX DOCD: CPT | Mod: CPTII,S$GLB,, | Performed by: SPECIALIST

## 2021-05-19 PROCEDURE — 1126F AMNT PAIN NOTED NONE PRSNT: CPT | Mod: S$GLB,,, | Performed by: SPECIALIST

## 2021-05-19 PROCEDURE — 3008F PR BODY MASS INDEX (BMI) DOCUMENTED: ICD-10-PCS | Mod: CPTII,S$GLB,, | Performed by: SPECIALIST

## 2021-05-19 PROCEDURE — 99213 PR OFFICE/OUTPT VISIT, EST, LEVL III, 20-29 MIN: ICD-10-PCS | Mod: S$GLB,,, | Performed by: SPECIALIST

## 2021-05-19 PROCEDURE — 99999 PR PBB SHADOW E&M-EST. PATIENT-LVL III: CPT | Mod: PBBFAC,,, | Performed by: SPECIALIST

## 2022-10-26 ENCOUNTER — HOSPITAL ENCOUNTER (EMERGENCY)
Facility: HOSPITAL | Age: 22
Discharge: HOME | End: 2022-10-26
Attending: EMERGENCY MEDICINE
Payer: MEDICAID

## 2022-10-26 VITALS
BODY MASS INDEX: 34.49 KG/M2 | DIASTOLIC BLOOD PRESSURE: 73 MMHG | HEART RATE: 76 BPM | RESPIRATION RATE: 17 BRPM | WEIGHT: 202 LBS | TEMPERATURE: 99 F | HEIGHT: 64 IN | OXYGEN SATURATION: 99 % | SYSTOLIC BLOOD PRESSURE: 118 MMHG

## 2022-10-26 DIAGNOSIS — M25.572 ACUTE LEFT ANKLE PAIN: ICD-10-CM

## 2022-10-26 DIAGNOSIS — R60.9 SWELLING: ICD-10-CM

## 2022-10-26 DIAGNOSIS — S80.211A ABRASION OF RIGHT KNEE, INITIAL ENCOUNTER: ICD-10-CM

## 2022-10-26 DIAGNOSIS — S99.912A INJURY OF LEFT ANKLE, INITIAL ENCOUNTER: Primary | ICD-10-CM

## 2022-10-26 LAB
B-HCG UR QL: NEGATIVE
CTP QC/QA: YES

## 2022-10-26 PROCEDURE — 25000003 PHARM REV CODE 250: Performed by: NURSE PRACTITIONER

## 2022-10-26 PROCEDURE — 63600175 PHARM REV CODE 636 W HCPCS: Performed by: NURSE PRACTITIONER

## 2022-10-26 PROCEDURE — 81025 URINE PREGNANCY TEST: CPT | Performed by: NURSE PRACTITIONER

## 2022-10-26 PROCEDURE — 90715 TDAP VACCINE 7 YRS/> IM: CPT | Performed by: NURSE PRACTITIONER

## 2022-10-26 PROCEDURE — 90471 IMMUNIZATION ADMIN: CPT | Performed by: NURSE PRACTITIONER

## 2022-10-26 PROCEDURE — 99284 EMERGENCY DEPT VISIT MOD MDM: CPT | Mod: 25

## 2022-10-26 PROCEDURE — 29515 APPLICATION SHORT LEG SPLINT: CPT | Mod: LT

## 2022-10-26 RX ORDER — HYDROCODONE BITARTRATE AND ACETAMINOPHEN 5; 325 MG/1; MG/1
1 TABLET ORAL
Status: COMPLETED | OUTPATIENT
Start: 2022-10-26 | End: 2022-10-26

## 2022-10-26 RX ORDER — IBUPROFEN 600 MG/1
600 TABLET ORAL EVERY 6 HOURS PRN
Qty: 20 TABLET | Refills: 0 | Status: SHIPPED | OUTPATIENT
Start: 2022-10-26

## 2022-10-26 RX ADMIN — IBUPROFEN 600 MG: 400 TABLET ORAL at 03:10

## 2022-10-26 RX ADMIN — CLOSTRIDIUM TETANI TOXOID ANTIGEN (FORMALDEHYDE INACTIVATED), CORYNEBACTERIUM DIPHTHERIAE TOXOID ANTIGEN (FORMALDEHYDE INACTIVATED), BORDETELLA PERTUSSIS TOXOID ANTIGEN (GLUTARALDEHYDE INACTIVATED), BORDETELLA PERTUSSIS FILAMENTOUS HEMAGGLUTININ ANTIGEN (FORMALDEHYDE INACTIVATED), BORDETELLA PERTUSSIS PERTACTIN ANTIGEN, AND BORDETELLA PERTUSSIS FIMBRIAE 2/3 ANTIGEN 0.5 ML: 5; 2; 2.5; 5; 3; 5 INJECTION, SUSPENSION INTRAMUSCULAR at 03:10

## 2022-10-26 RX ADMIN — HYDROCODONE BITARTRATE AND ACETAMINOPHEN 1 TABLET: 5; 325 TABLET ORAL at 03:10

## 2022-10-26 NOTE — ED PROVIDER NOTES
"Encounter Date: 10/26/2022       History     Chief Complaint   Patient presents with    Ankle Injury     Pt twisted left ankle when she stepped in a hole today.  States her foot was "sideways" and she "popped it back into place".      22-year-old female presents to the ER today with complaints of left lateral ankle malleolar pain and swelling as well as left lateral foot pain that began today around 11 a.m. after accidentally falling in a small pothole that her dogs had created from digging in the ground today while running in the ER with her dogs.  She states she twisted her left ankle, and felt it pop.  She had to manually move her ankle back into its normal anatomical position.  Since then she has been unable bear weight.  Has pain to her left lateral ankle and foot.  Noticing some soft tissue swelling.  She reports as a young child she did have a prior history of a prior left ankle fracture that healed with casting.  No hardware to her left ankle cording to patient no previous surgeries.  She states since this injury today she has been unable to bear weight or walk.  She also sustained a minor superficial abrasion to the top of her right anterior knee from this fall injury today.  She denies hitting her head or losing consciousness.  No other injuries.  She is not sure when her last tetanus injection was.    Review of patient's allergies indicates:  No Known Allergies  Past Medical History:   Diagnosis Date    Cyclic vomiting syndrome      No past surgical history on file.  Family History   Problem Relation Age of Onset    No Known Problems Mother     No Known Problems Father     No Known Problems Sister     Asthma Brother     Cancer Maternal Grandmother         skin cancer on nose    Diabetes Paternal Grandmother     Cancer Paternal Grandmother 72        previous smoker/ lung ca    Diabetes Paternal Grandfather     Breast cancer Other     Ovarian cancer Neg Hx      Social History     Tobacco Use    Smoking " status: Never    Smokeless tobacco: Never    Tobacco comments:     mom smokes outside   Substance Use Topics    Alcohol use: Yes     Comment: soic    Drug use: Never     Review of Systems   Constitutional:  Negative for appetite change, chills, diaphoresis, fatigue and fever.   HENT:  Negative for congestion, postnasal drip, rhinorrhea, sinus pressure, sinus pain, sneezing and sore throat.    Eyes:  Negative for photophobia and visual disturbance.   Respiratory:  Negative for cough, chest tightness, shortness of breath, wheezing and stridor.    Cardiovascular:  Negative for palpitations and leg swelling.   Gastrointestinal:  Negative for abdominal distention, abdominal pain, diarrhea, nausea and vomiting.   Endocrine: Negative for polydipsia, polyphagia and polyuria.   Musculoskeletal:  Positive for arthralgias, joint swelling and myalgias. Negative for back pain, gait problem, neck pain and neck stiffness.   Skin:  Positive for wound. Negative for color change.   Allergic/Immunologic: Negative for immunocompromised state.   Neurological:  Negative for dizziness, tremors, seizures, syncope, weakness, light-headedness and headaches.   Psychiatric/Behavioral:  Negative for agitation and confusion.    All other systems reviewed and are negative.    Physical Exam     Initial Vitals [10/26/22 1206]   BP Pulse Resp Temp SpO2   118/73 76 18 98.8 °F (37.1 °C) 99 %      MAP       --         Physical Exam    Nursing note and vitals reviewed.  Constitutional: She appears well-developed and well-nourished. She is not diaphoretic. No distress.   HENT:   Head: Normocephalic and atraumatic.   Right Ear: External ear normal.   Left Ear: External ear normal.   Nose: Nose normal.   Mouth/Throat: Oropharynx is clear and moist. No oropharyngeal exudate.   Eyes: Conjunctivae are normal. Pupils are equal, round, and reactive to light.   Neck: Neck supple.   Normal range of motion.  Cardiovascular:  Normal rate.           No murmur  heard.  Pulmonary/Chest: Breath sounds normal. She has no wheezes. She has no rhonchi. She has no rales.   Abdominal: Abdomen is soft. Bowel sounds are normal. There is no abdominal tenderness.   Musculoskeletal:         General: Tenderness present.      Cervical back: Normal range of motion and neck supple.      Comments: Left lateral malleolar ankle pain and swelling and tenderness on exam.  She has decreased range of motion.  Also some slight swelling and tenderness to the left lateral upper foot.  Normal dorsalis pedis pulse on the left foot.  Normal distal cap refill to all toes.  No obvious deformity noted.      Neurological: She is alert and oriented to person, place, and time. She has normal strength. GCS score is 15. GCS eye subscore is 4. GCS verbal subscore is 5. GCS motor subscore is 6.   Skin: Skin is warm and dry. No rash noted. No erythema.   Psychiatric: She has a normal mood and affect. Thought content normal.       ED Course   Splint Application    Date/Time: 10/26/2022 3:56 PM  Performed by: Tiana Phan NP  Authorized by: Harsh Mathis MD   Location details: left ankle  Splint type: short leg  Supplies used: Ortho-Glass  Post-procedure: The splinted body part was neurovascularly unchanged following the procedure.  Patient tolerance: Patient tolerated the procedure well with no immediate complications      Labs Reviewed   POCT URINE PREGNANCY          Imaging Results              X-Ray Foot Complete Left (Final result)  Result time 10/26/22 15:18:12      Final result by Vivek Claire MD (10/26/22 15:18:12)                   Narrative:    Reason: Left foot swelling and pain.    FINDINGS:    Three views of the left foot show no fracture, dislocation, or destructive osseous lesion. Calcaneal spur noted. An os peroneum is noted. No gross soft tissue abnormality.    IMPRESSION:    No acute osseous abnormality of the left foot.    Electronically signed by:  Vivek Claire DO  10/26/2022 3:18 PM  CDT Workstation: [id number]                                     X-Ray Ankle Complete Left (Final result)  Result time 10/26/22 13:38:01      Final result by Renan Meredith MD (10/26/22 13:38:01)                   Narrative:    HISTORY: Left ankle pain, twisting injury.    FINDINGS: 3 views of the left ankle show no acute fracture, dislocation or destructive osseous lesion. The ankle mortise is intact, with the joint spaces preserved, and normal bone mineralization. There is lateral soft tissue swelling.    IMPRESSION: Soft tissue swelling, with no acute fracture or dislocation.    Electronically signed by:  Renan Meredith MD  10/26/2022 1:38 PM CDT Workstation: [id number]                                     Medications   HYDROcodone-acetaminophen 5-325 mg per tablet 1 tablet (1 tablet Oral Given 10/26/22 1518)   ibuprofen tablet 600 mg (600 mg Oral Given 10/26/22 1518)   Tdap vaccine injection 0.5 mL (0.5 mLs Intramuscular Given 10/26/22 1525)     Medical Decision Making:   Clinical Tests:   Radiological Study: Ordered and Reviewed  X-ray of the left foot and left ankle reveal no acute osseous abnormalities according to the radiology read.  I am not able to visualize an obvious fracture either.  No dislocation.  Patient has significant swelling to her left lateral malleolar region, unable to bear weight, and we will protect with a posterior short-leg Ortho Glass splint in the event of a hairline fracture that is too sick earlier to soon or too small to be able to tell on this plain film x-ray today.  We will have patient use crutches nonweightbearing over-the-counter Tylenol or prescription Motrin for management of her pain and to follow up with an orthopedist for re-evaluation.  ER return precautions discussed she understands she should return for any new or worsening symptoms.           Attending Attestation:     Physician Attestation Statement for NP/PA:       Other NP/PA Attestation Additions:    History of  Present Illness: I was not called upon to see this patient but was available for consultation                           Clinical Impression:   Final diagnoses:  [R60.9] Swelling  [S99.912A] Injury of left ankle, initial encounter (Primary)  [M25.572] Acute left ankle pain  [S80.211A] Abrasion of right knee, initial encounter        ED Disposition Condition    Discharge Stable          ED Prescriptions       Medication Sig Dispense Start Date End Date Auth. Provider    ibuprofen (ADVIL,MOTRIN) 600 MG tablet Take 1 tablet (600 mg total) by mouth every 6 (six) hours as needed for Pain. 20 tablet 10/26/2022 -- Tiana Phan NP          Follow-up Information       Follow up With Specialties Details Why Contact Info Additional Information    Vivek Cantrell MD Sports Medicine, Orthopedic Surgery Schedule an appointment as soon as possible for a visit in 3 days for ER visit follow up and re-evaluation [address] [place] [address] [address]  [facility] [address]  [phone]       [facility] Emergency Dept Emergency Medicine Go to  As needed, If symptoms worsen [address]  [facility] [address]  [phone] 1st floor    [facility] Emergency Dept Emergency Medicine Go to  As needed, If symptoms worsen [address]  [facility] [address]  [phone] 1st floor             Tiana Pahn NP  10/26/22 1558       Harsh Mathis MD  10/26/22 1819

## 2022-10-26 NOTE — Clinical Note
"Morena Carrascomartha Paige was seen and treated in our emergency department on 10/26/2022.  She may return to work on 10/31/2022.       If you have any questions or concerns, please don't hesitate to call.      Tiana Phan NP"

## 2022-10-27 ENCOUNTER — TELEPHONE (OUTPATIENT)
Dept: ORTHOPEDICS | Facility: CLINIC | Age: 22
End: 2022-10-27
Payer: MEDICAID

## 2022-10-27 NOTE — TELEPHONE ENCOUNTER
----- Message from Vivek Cantrell MD sent at 10/27/2022  9:40 AM CDT -----  Contact: patient  She can make an appointment if she wants to pay but needs to know that it is cash up front.  ----- Message -----  From: Pilar Sprague LPN  Sent: 10/27/2022   9:39 AM CDT  To: Vivek Cantrell MD    Pt is cash pay, but seen at ED for foot ankle issue. Please advise.   ----- Message -----  From: Melchor Agudelo MA  Sent: 10/27/2022   9:32 AM CDT  To: Óscar Sibley Staff    Patient called in and was seen at [facility] ER yesterday 10/26/22.  Patient stated she has swollen left ankle but stated x-rays showed no fractures.    Patient was told to call for appt.  Patient is cash pay.    Call back number for patient is [phone]

## 2022-11-22 DIAGNOSIS — S93.492D HIGH ANKLE SPRAIN, LEFT, SUBSEQUENT ENCOUNTER: Primary | ICD-10-CM

## 2022-11-23 ENCOUNTER — CLINICAL SUPPORT (OUTPATIENT)
Dept: REHABILITATION | Facility: HOSPITAL | Age: 22
End: 2022-11-23
Payer: MEDICAID

## 2022-11-23 DIAGNOSIS — Z74.09 IMPAIRED FUNCTIONAL MOBILITY, BALANCE, GAIT, AND ENDURANCE: ICD-10-CM

## 2022-11-23 DIAGNOSIS — R29.898 WEAKNESS OF LEFT LOWER EXTREMITY: ICD-10-CM

## 2022-11-23 DIAGNOSIS — M25.672 DECREASED RANGE OF MOTION OF LEFT ANKLE: ICD-10-CM

## 2022-11-23 PROCEDURE — 97161 PT EVAL LOW COMPLEX 20 MIN: CPT | Mod: PN

## 2022-11-25 PROBLEM — R29.898 WEAKNESS OF LEFT LOWER EXTREMITY: Status: ACTIVE | Noted: 2022-11-25

## 2022-11-25 PROBLEM — M25.672 DECREASED RANGE OF MOTION OF LEFT ANKLE: Status: ACTIVE | Noted: 2022-11-25

## 2022-11-25 PROBLEM — Z74.09 IMPAIRED FUNCTIONAL MOBILITY, BALANCE, GAIT, AND ENDURANCE: Status: ACTIVE | Noted: 2022-11-25

## 2022-11-25 NOTE — PLAN OF CARE
"[facility]   Physical Therapy Initial Evaluation     Date: 11/23/2022   Name: Morena Paige  Clinic Number: [id number]    Therapy Diagnosis:   Encounter Diagnoses   Name Primary?    Decreased range of motion of left ankle     Weakness of left lower extremity     Impaired functional mobility, balance, gait, and endurance      Physician: Ye Davidson MD    Physician Orders: PT Eval and Treat   Medical Diagnosis from Referral: S93.492D (ICD-10-CM) - High ankle sprain, left, subsequent encounter  Evaluation Date: 11/23/2022  Authorization Period Expiration: 11/22/2023  Plan of Care Expiration: 01/30/2023  Progress Note Due: 12/23/2022  Visit # / Visits authorized: 1/ 1   FOTO: 1/ 3     Return to MD date: prn    Precautions: Standard    Time In: 1000  Time Out: 1045  Total Appointment Time (timed & untimed codes): 45 minutes      SUBJECTIVE   Date of onset: 10/26/2022    History of current condition - Morena reports: she was running around in her back yard with her dogs when she stepped in a hole with her Left foot and fell. Her Left ankle went into a plantar flexed and inverted position requiring her to "put her ankle back in place". She went to the emergency room, who x-rayed her foot and put her in a splint. She then went to Dr. Davidson who diagnosed her with a sprain. At first she couldn't move her foot at all and she couldn't put weight through it. She has been non weightbearing using a scooter since then. She saw Dr. Crenshaw again last week who told her to start PT. She denies numbness/tingling.    Falls: no     Imaging, x-ray: IMPRESSION: Soft tissue swelling, with no acute fracture or dislocation.       Prior Therapy: no  Social History:  lives with their family   Occupation:  at [employer] and is a [employer] at [employer] studying elementary education  Prior Level of Function: prior to injury   Current Level of Function: Non weight bearing on Left Lower Extremity     Pain:   Current " 0/10, worst 8/10, best 0/10   Location: left ankle    Description: Aching  Aggravating Factors: Movement and weight bearing, or if something hits her ankle   Easing Factors: rest and ib preufen    Patients goals: Return to walking without pain and return to prior level of function     Medical History:   Past Medical History:   Diagnosis Date    Cyclic vomiting syndrome        Surgical History:   Morena Paige  has no past surgical history on file.    Medications:   Morena has a current medication list which includes the following prescription(s): cetirizine, desonide, fluticasone propionate, ibuprofen, metformin, norethindrone-ethinyl estradiol, and olopatadine.    Allergies:   Review of patient's allergies indicates:  No Known Allergies       OBJECTIVE     Observation: Pt presents Non weight bearing on Left Lower Extremity with scooter     Posture: holds Left foot in plantar flexed and inverted position     Active/Passive Range of Motion:   Ankle Right Left   DF (knee extended) 8/10 -22/-10   Plantarflexion 50 50   Inversion 30 20   Eversion 10 0      Strength:  Ankle Right Left   Dorsiflexion 5/5 2/5   Plantarflexion 5/5 on table  3/5 on table    Inversion 5/5 3-/5   Eversion 5/5 3-/5     Special Tests:  Anterior Drawer Positive on Left    Talar tilt Positive on Left        Joint Mobility: Limited talocrural AP glide on Left and limited subtalar eversion on Left     Palpation: TTP to ATFL    Sensation: light touch intact         Girth Measurement Fig-8   Right 56 cm   Left 58 cm           CMS Impairment/Limitation/Restriction for FOTO Ankle Survey  Status Limitation G-Code CMS Severity Modifier  Intake 20% 80% Current Status CM - At least 80 percent but less than 100 percent  Predicted 58% 42% Goal Status+ CK - At least 40 percent but less than 60 percent         TREATMENT     Total Treatment time (time-based codes) separate from Evaluation: 15 minutes      Morena received the treatments listed below:       therapeutic exercises to develop strength, endurance, and ROM for 15 minutes including:    Half kneeling ankle Dorsiflexion x20 with 5s hold   Calf stretch with strap 3x15s  Seated heel slides x10  Seated ABCs   Lateral weight shifts with crutches x10       PATIENT EDUCATION AND HOME EXERCISES     Education provided:   - Home Exercise Program to be performed 2 times daily     Written Home Exercises Provided: yes. Exercises were reviewed and Morena was able to demonstrate them prior to the end of the session.  Morena demonstrated good  understanding of the education provided. See EMR under Patient Instructions for exercises provided during therapy sessions.    ASSESSMENT     Morena is a 22 y.o. female referred to outpatient Physical Therapy with a medical diagnosis of Left high ankle sprain. Patient presents Non weight bearing on Left Lower Extremity and demonstrates decreased Left ankle Range of Motion and Left Lower Extremity weakness. Her symptoms and deficits are limiting her ability to perform weight bearing, ambulation, household chores, lifting/carrying activities, and work duties.     Patient prognosis is Excellent.   Patientt will benefit from skilled outpatient Physical Therapy to address the deficits stated above and in the chart below, provide patient /family education, and to maximize patientt's level of independence.     Plan of care discussed with patient: Yes  Patient's spiritual, cultural and educational needs considered and patient is agreeable to the plan of care and goals as stated below:     Anticipated Barriers for therapy: Time spent non weightbearing    Medical Necessity is demonstrated by the following  History  Co-morbidities and personal factors that may impact the plan of care Co-morbidities:   none    Personal Factors:   no deficits     low   Examination  Body Structures and Functions, activity limitations and participation restrictions that may impact the plan of care Body Regions:    lower extremities    Body Systems:    ROM  strength  balance  gait  motor control    Participation Restrictions:   weight bearing, ambulation, household chores, lifting/carrying activities, and work duties    Activity limitations:   Learning and applying knowledge  no deficits    General Tasks and Commands  no deficits    Communication  no deficits    Mobility  lifting and carrying objects  walking    Self care  dressing    Domestic Life  cooking  doing house work (cleaning house, washing dishes, laundry)    Interactions/Relationships  no deficits    Life Areas  no deficits    Community and Social Life  community life  recreation and leisure         moderate   Clinical Presentation evolving clinical presentation with changing clinical characteristics moderate   Decision Making/ Complexity Score: low     Goals:  Short Term Goals: 4 weeks   Pt will be IND with initial HEP to manage symptoms outside of PT.   Pt will report Left ankle pain </= 0/10 with household ambulation to demonstrate improved condition.   Pt will improve MMT of Left ankle/foot by >/= 1/5 to improve tolerance for ambulation.   Pt will improve Left ankle ROM by >= 5 degrees to improve tolerance for Full weight bearing.     Long Term Goals: 10 weeks   Pt will improve FOTO score to </= 42% limited to demonstrate improved functional mobility.  Pt will be IND with final HEP to maintain/improve strength and mobility gained in PT.   Pt will report Left ankle pain </= 0/10 with work duties to demonstrate improved condition.   Pt will improve MMT of BILATERAL LOWER EXTREMITY  to >/= 4/ 5 to improve tolerance for lifting/carrying activities.   Pt will improve Left ankle ROM = to uninvolved side to improve tolerance for squatting.   Pt goal: Pt will report confidence in managing condition upon discharge from PT.       PLAN   Plan of care Certification: 11/23/2022 to 01/30/2023    Outpatient Physical Therapy 1-2 times weekly for 10 weeks to include the  following interventions: Gait Training, Manual Therapy, Moist Heat/ Ice, Neuromuscular Re-ed, Patient Education, Therapeutic Activities, and Therapeutic Exercise.     Jim Lam, PT  Board Certified Clinical Specialist in Orthopedic Physical Therapy      I CERTIFY THE NEED FOR THESE SERVICES FURNISHED UNDER THIS PLAN OF TREATMENT AND WHILE UNDER MY CARE   Physician's comments:     Physician's Signature: ___________________________________________________

## 2022-11-28 ENCOUNTER — CLINICAL SUPPORT (OUTPATIENT)
Dept: REHABILITATION | Facility: HOSPITAL | Age: 22
End: 2022-11-28
Payer: MEDICAID

## 2022-11-28 DIAGNOSIS — M25.672 DECREASED RANGE OF MOTION OF LEFT ANKLE: Primary | ICD-10-CM

## 2022-11-28 DIAGNOSIS — R29.898 WEAKNESS OF LEFT LOWER EXTREMITY: ICD-10-CM

## 2022-11-28 DIAGNOSIS — Z74.09 IMPAIRED FUNCTIONAL MOBILITY, BALANCE, GAIT, AND ENDURANCE: ICD-10-CM

## 2022-11-28 PROCEDURE — 97110 THERAPEUTIC EXERCISES: CPT | Mod: PN,CQ

## 2022-11-28 NOTE — PROGRESS NOTES
[facility]   Physical Therapy Treatment Note     Name: Morena Paige  Clinic Number: [id number]    Therapy Diagnosis:   Encounter Diagnoses   Name Primary?    Decreased range of motion of left ankle Yes    Weakness of left lower extremity     Impaired functional mobility, balance, gait, and endurance      Physician: Ye Davidson MD    Visit Date: 11/28/2022    Physician Orders: PT Eval and Treat   Medical Diagnosis from Referral: S93.492D (ICD-10-CM) - High ankle sprain, left, subsequent encounter  Evaluation Date: 11/23/2022  Authorization Period Expiration: 11/22/2023  Plan of Care Expiration: 01/30/2023  Progress Note Due: 12/23/2022  Visit # / Visits authorized: 1/20   FOTO: 1/ 3      Return to MD date: prn     Precautions: Standard  PTA Visit #: 1/5     FOTO first follow up:   FOTO second follow up:     Time In: 1300  Time Out: 1355  Total Billable Time: 55 minutes    SUBJECTIVE     Pt reports: Continues to have tenderness upon palpation of lateral ankle and difficulty putting any weight on her L LE. She attempted half kneeling weight bearing at home but was too painful to complete   She was compliant with home exercise program.  Response to previous treatment: positive   Functional change: First treat after eval     Pain: 1/10  Location: left ankles     OBJECTIVE     Objective Measures updated at progress report unless specified.     Treatment     Morena received the treatments listed below:      therapeutic exercises to develop strength, endurance, and ROM for 50 minutes including:     Half kneeling ankle Dorsiflexion x20 with 5s hold   Calf stretch with strap 3x15s  Seated ABCs, x 2   Lateral weight shifts at mat  Gait training with axillary crutches for WB   Shuttle DL press, 2 x 3' 25# ; x 3' 37#   Shuttle DL calf press, 3 x 10 23#  Shuttle SL press, 3 x 8 12#    manual therapy techniques: Joint mobilizations were applied to the: L ankle for 5 minutes, including:  DF  mobs      Patient Education and Home Exercises     Home Exercises Provided and Patient Education Provided     Education provided:   - Continue HEP   -Transition to walking with crutches at home to allow for some WB    Written Home Exercises Provided: Patient instructed to cont prior HEP. Exercises were reviewed and Morena was able to demonstrate them prior to the end of the session.  Morena demonstrated good  understanding of the education provided. See EMR under Patient Instructions for exercises provided during therapy sessions    ASSESSMENT     Patient utilized knee scooter while entering clinic. Patient continues to have difficulty with WB but made big improvements today with shuttle and ability to ambulate inside clinic with crutches to allow for WB per patient tolerance. Patient lacking DF. Pt with good tolerance to therapy session with no adverse effects reported.        Morena Is progressing well towards her goals.   Pt prognosis is Good.     Pt will continue to benefit from skilled outpatient physical therapy to address the deficits listed in the problem list box on initial evaluation, provide pt/family education and to maximize pt's level of independence in the home and community environment.     Pt's spiritual, cultural and educational needs considered and pt agreeable to plan of care and goals.     Anticipated barriers to physical therapy: Time spent NWB       Goals:  Short Term Goals: 4 weeks   Pt will be IND with initial HEP to manage symptoms outside of PT.   Pt will report Left ankle pain </= 0/10 with household ambulation to demonstrate improved condition.   Pt will improve MMT of Left ankle/foot by >/= 1/5 to improve tolerance for ambulation.   Pt will improve Left ankle ROM by >= 5 degrees to improve tolerance for Full weight bearing.      Long Term Goals: 10 weeks   Pt will improve FOTO score to </= 42% limited to demonstrate improved functional mobility.  Pt will be IND with final HEP to  maintain/improve strength and mobility gained in PT.   Pt will report Left ankle pain </= 0/10 with work duties to demonstrate improved condition.   Pt will improve MMT of BILATERAL LOWER EXTREMITY  to >/= 4/ 5 to improve tolerance for lifting/carrying activities.   Pt will improve Left ankle ROM = to uninvolved side to improve tolerance for squatting.   Pt goal: Pt will report confidence in managing condition upon discharge from PT.        PLAN     Continue to treat and progress per POC and pt tolerance         Kyle Aguilera, PTA

## 2022-11-30 ENCOUNTER — CLINICAL SUPPORT (OUTPATIENT)
Dept: REHABILITATION | Facility: HOSPITAL | Age: 22
End: 2022-11-30
Payer: MEDICAID

## 2022-11-30 DIAGNOSIS — R29.898 WEAKNESS OF LEFT LOWER EXTREMITY: ICD-10-CM

## 2022-11-30 DIAGNOSIS — Z74.09 IMPAIRED FUNCTIONAL MOBILITY, BALANCE, GAIT, AND ENDURANCE: ICD-10-CM

## 2022-11-30 DIAGNOSIS — M25.672 DECREASED RANGE OF MOTION OF LEFT ANKLE: Primary | ICD-10-CM

## 2022-11-30 PROCEDURE — 97110 THERAPEUTIC EXERCISES: CPT | Mod: PN

## 2022-11-30 NOTE — PROGRESS NOTES
"[facility]   Physical Therapy Treatment Note     Name: Morena Paige  Clinic Number: [id number]    Therapy Diagnosis:   Encounter Diagnoses   Name Primary?    Decreased range of motion of left ankle Yes    Weakness of left lower extremity     Impaired functional mobility, balance, gait, and endurance      Physician: Ye Davidson MD    Visit Date: 11/30/2022    Physician Orders: PT Eval and Treat   Medical Diagnosis from Referral: S93.492D (ICD-10-CM) - High ankle sprain, left, subsequent encounter  Evaluation Date: 11/23/2022  Authorization Period Expiration: 11/22/2023  Plan of Care Expiration: 01/30/2023  Progress Note Due: 12/23/2022  Visit # / Visits authorized: 2/4  FOTO: 1/ 3      Return to MD date: prn     Precautions: Standard  PTA Visit #: 0/5     FOTO first follow up:   FOTO second follow up:     Time In: 1300  Time Out: 1353  Total Billable Time: 53 minutes    SUBJECTIVE     Pt reports: has been practicing walking with the crutches at home, not using the scooter anymore     She was compliant with home exercise program.  Response to previous treatment: positive   Functional change: First treat after eval     Pain: 1/10  Location: left ankles     OBJECTIVE     Objective Measures updated at progress report unless specified.     Treatment   All interventions billed as therex per medicaid guidelines       Morena received the treatments listed below:      therapeutic exercises to develop strength, endurance, and ROM for 35 minutes including:     Long sitting gastroc stretch 5x15s   Standing gastroc stretch with Left foot on 2" step 5x15s   Shuttle DL press, 3x15 with 75#    Shuttle Single Leg  press 3x15 with 50#   Shuttle DL calf press, 3 x 12 with 50#  Box squats to chair with airex 3x10 with 2" step under Left heel   Half kneeling ankle Dorsiflexion x20 with 5s hold with posterior talar glide       Not today:   Seated ABCs, x 2   Lateral weight shifts at mat  Gait training with " "axillary crutches for WB   Shuttle SL press, 3 x 8 12#    manual therapy techniques: Joint mobilizations were applied to the: L ankle for 8 minutes, including     Talocrural AP glide grade III-IV   Subtalar figure 8     Gait training to improve tolerance for weightbearing and normalize gait pattern x10 mins     Stepping onto scale on Left Lower Extremity able to tolerate 100% body weight   Ambulating with Single crutch x50ft   Stepping over 6" hurdles with single crutch x1   Stepping over 6" hurdles without AD x3laps     Patient Education and Home Exercises     Home Exercises Provided and Patient Education Provided     Education provided:   - Continue HEP   -Transition to walking with crutches at home to allow for some WB    Written Home Exercises Provided: Patient instructed to cont prior HEP. Exercises were reviewed and Morena was able to demonstrate them prior to the end of the session.  Morena demonstrated good  understanding of the education provided. See EMR under Patient Instructions for exercises provided during therapy sessions    ASSESSMENT     Morena was able to progress to full weightbearing today. Although improving she continues to present lacking Left ankle Dorsiflexion which is limiting her ability to normalize gait. Will progress as able.     Morena Is progressing well towards her goals.   Pt prognosis is Good.     Pt will continue to benefit from skilled outpatient physical therapy to address the deficits listed in the problem list box on initial evaluation, provide pt/family education and to maximize pt's level of independence in the home and community environment.     Pt's spiritual, cultural and educational needs considered and pt agreeable to plan of care and goals.     Anticipated barriers to physical therapy: Time spent NWB       Goals:  Short Term Goals: 4 weeks   Pt will be IND with initial HEP to manage symptoms outside of PT.   Pt will report Left ankle pain </= 0/10 with household ambulation " to demonstrate improved condition.   Pt will improve MMT of Left ankle/foot by >/= 1/5 to improve tolerance for ambulation.   Pt will improve Left ankle ROM by >= 5 degrees to improve tolerance for Full weight bearing.      Long Term Goals: 10 weeks   Pt will improve FOTO score to </= 42% limited to demonstrate improved functional mobility.  Pt will be IND with final HEP to maintain/improve strength and mobility gained in PT.   Pt will report Left ankle pain </= 0/10 with work duties to demonstrate improved condition.   Pt will improve MMT of BILATERAL LOWER EXTREMITY  to >/= 4/ 5 to improve tolerance for lifting/carrying activities.   Pt will improve Left ankle ROM = to uninvolved side to improve tolerance for squatting.   Pt goal: Pt will report confidence in managing condition upon discharge from PT.        PLAN     Continue plan of care with focus on improving Left ankle Dorsiflexion and weight bearing tolerance to normalize gait.      iJm Lam, PT   Board Certified Clinical Specialist in Orthopedic Physical Therapy

## 2022-12-05 ENCOUNTER — CLINICAL SUPPORT (OUTPATIENT)
Dept: REHABILITATION | Facility: HOSPITAL | Age: 22
End: 2022-12-05
Payer: MEDICAID

## 2022-12-05 DIAGNOSIS — R29.898 WEAKNESS OF LEFT LOWER EXTREMITY: ICD-10-CM

## 2022-12-05 DIAGNOSIS — Z74.09 IMPAIRED FUNCTIONAL MOBILITY, BALANCE, GAIT, AND ENDURANCE: ICD-10-CM

## 2022-12-05 DIAGNOSIS — M25.672 DECREASED RANGE OF MOTION OF LEFT ANKLE: Primary | ICD-10-CM

## 2022-12-05 PROCEDURE — 97110 THERAPEUTIC EXERCISES: CPT | Mod: PN,CQ

## 2022-12-05 NOTE — PROGRESS NOTES
"  [facility]   Physical Therapy Treatment Note     Name: Morena Paige  Clinic Number: [id number]    Therapy Diagnosis:   Encounter Diagnoses   Name Primary?    Decreased range of motion of left ankle Yes    Weakness of left lower extremity     Impaired functional mobility, balance, gait, and endurance      Physician: Ye Davidson MD    Visit Date: 12/5/2022    Physician Orders: PT Eval and Treat   Medical Diagnosis from Referral: S93.492D (ICD-10-CM) - High ankle sprain, left, subsequent encounter  Evaluation Date: 11/23/2022  Authorization Period Expiration: 11/22/2023  Plan of Care Expiration: 01/30/2023  Progress Note Due: 12/23/2022  Visit # / Visits authorized: 3/4  FOTO: 1/ 3      Return to MD date: prn     Precautions: Standard  PTA Visit #: 1/5     FOTO first follow up:   FOTO second follow up:     Time In: 1300  Time Out: 1345  Total Billable Time: 45 minutes    SUBJECTIVE     Pt reports: has been practicing walking with the crutches at home, not using the scooter anymore     She was compliant with home exercise program.  Response to previous treatment: positive   Functional change: Mobile without AD      Pain: 3/10  Location: left ankles     OBJECTIVE     Objective Measures updated at progress report unless specified.     Treatment   All interventions billed as therex per medicaid guidelines       Morena received the treatments listed below:      therapeutic exercises to develop strength, endurance, and ROM for 45 minutes including:     Long sitting gastroc stretch 5x15s   Standing gastroc stretch with Left foot on 2" step 5x15s   Shuttle DL press, 3x15 with 75#    Shuttle Single Leg  press 3x15 with 50#   Shuttle DL calf press, 3 x 12 with 50#  Box squats to chair with airex 3x10 with 2" step under Left heel   Half kneeling ankle Dorsiflexion x20 with 5s hold with posterior talar glide   Standing calf raises, 3 x 12       Not today:   Seated ABCs, x 2   Lateral weight shifts " "at mat        manual therapy techniques: Joint mobilizations were applied to the: L ankle for 8 minutes, including     Talocrural AP glide grade III-IV   Subtalar figure 8     Gait training to improve tolerance for weightbearing and normalize gait pattern x0 mins     Stepping onto scale on Left Lower Extremity able to tolerate 100% body weight   Ambulating with Single crutch x50ft   Stepping over 6" hurdles with single crutch x1   Stepping over 6" hurdles without AD x3laps     Patient Education and Home Exercises     Home Exercises Provided and Patient Education Provided     Education provided:   - Continue HEP   -Transition to walking with crutches at home to allow for some WB    Written Home Exercises Provided: Patient instructed to cont prior HEP. Exercises were reviewed and Morena was able to demonstrate them prior to the end of the session.  Morena demonstrated good  understanding of the education provided. See EMR under Patient Instructions for exercises provided during therapy sessions    ASSESSMENT     Progressing without AD as she is FWB. Pt with good tolerance to therapy session with no adverse effects reported.          Morena Is progressing well towards her goals.   Pt prognosis is Good.     Pt will continue to benefit from skilled outpatient physical therapy to address the deficits listed in the problem list box on initial evaluation, provide pt/family education and to maximize pt's level of independence in the home and community environment.     Pt's spiritual, cultural and educational needs considered and pt agreeable to plan of care and goals.     Anticipated barriers to physical therapy: Time spent NWB       Goals:  Short Term Goals: 4 weeks   Pt will be IND with initial HEP to manage symptoms outside of PT.   Pt will report Left ankle pain </= 0/10 with household ambulation to demonstrate improved condition.   Pt will improve MMT of Left ankle/foot by >/= 1/5 to improve tolerance for ambulation.   Pt " will improve Left ankle ROM by >= 5 degrees to improve tolerance for Full weight bearing.      Long Term Goals: 10 weeks   Pt will improve FOTO score to </= 42% limited to demonstrate improved functional mobility.  Pt will be IND with final HEP to maintain/improve strength and mobility gained in PT.   Pt will report Left ankle pain </= 0/10 with work duties to demonstrate improved condition.   Pt will improve MMT of BILATERAL LOWER EXTREMITY  to >/= 4/ 5 to improve tolerance for lifting/carrying activities.   Pt will improve Left ankle ROM = to uninvolved side to improve tolerance for squatting.   Pt goal: Pt will report confidence in managing condition upon discharge from PT.        PLAN     Continue plan of care with focus on improving Left ankle Dorsiflexion and weight bearing tolerance to normalize gait.      Kyle Aguilera PTA   Board Certified Clinical Specialist in Orthopedic Physical Therapy

## 2022-12-07 ENCOUNTER — CLINICAL SUPPORT (OUTPATIENT)
Dept: REHABILITATION | Facility: HOSPITAL | Age: 22
End: 2022-12-07
Payer: MEDICAID

## 2022-12-07 DIAGNOSIS — R29.898 WEAKNESS OF LEFT LOWER EXTREMITY: ICD-10-CM

## 2022-12-07 DIAGNOSIS — M25.672 DECREASED RANGE OF MOTION OF LEFT ANKLE: Primary | ICD-10-CM

## 2022-12-07 DIAGNOSIS — Z74.09 IMPAIRED FUNCTIONAL MOBILITY, BALANCE, GAIT, AND ENDURANCE: ICD-10-CM

## 2022-12-07 PROCEDURE — 97110 THERAPEUTIC EXERCISES: CPT | Mod: PN

## 2022-12-07 NOTE — PROGRESS NOTES
" [facility]   Physical Therapy Treatment Note/Re-Assessment    Name: Morena Paige  Clinic Number: [id number]    Therapy Diagnosis:   Encounter Diagnoses   Name Primary?    Decreased range of motion of left ankle Yes    Weakness of left lower extremity     Impaired functional mobility, balance, gait, and endurance      Physician: Ye Davidson MD    Visit Date: 12/7/2022    Physician Orders: PT Eval and Treat   Medical Diagnosis from Referral: S93.492D (ICD-10-CM) - High ankle sprain, left, subsequent encounter  Evaluation Date: 11/23/2022  Authorization Period Expiration: 11/22/2023  Plan of Care Expiration: 01/30/2023  Progress Note Due: 12/23/2022  Visit # / Visits authorized: 3/4  FOTO: 1/ 3      Return to MD date: prn     Precautions: Standard  PTA Visit #: 0/5     FOTO first follow up:   FOTO second follow up:     Time In: 1402  Time Out: 1457  Total Billable Time: 55 minutes    SUBJECTIVE     Pt reports: walking better, almost tripped on her way in the clinic today    She was compliant with home exercise program.  Response to previous treatment: positive   Functional change: Mobile without AD      Pain: 3/10  Location: left ankles     OBJECTIVE     Active/Passive Range of Motion:   Ankle Right Left   DF (knee extended) 8/10 -10/-8   Plantarflexion 50 50   Inversion 30 20   Eversion 10 5     Full weightbearing    Gait: decreased Dorsiflexion on Left, early heel off    Treatment   All interventions billed as therex per medicaid guidelines     PT technician assisted with treatment under direct supervision of PT     Morena received the treatments listed below:      therapeutic exercises to develop strength, endurance, and ROM for 55 minutes including:     Half kneeling ankle Dorsiflexion x20 with 5s hold with posterior talar glide   Long sitting gastroc stretch 5x15s   Standing gastroc stretch with Left foot on 2" step 5x15s   Shuttle DL press, 3x15 with 75#    Shuttle Single Leg  " "press 3x15 with 50#   Shuttle DL calf press, 3 x 12 with 50#  Box squats to chair with airex 3x10 with 2" step under Left heel   Step ups on 6" step 3x12  Prowler sled push [id number] there and back   Lateral walks with Red Theraband [id number] there and back    Not today:   Seated ABCs, x 2   Lateral weight shifts at mat        manual therapy techniques: Joint mobilizations were applied to the: L ankle for 0 minutes, including     Talocrural AP glide grade III-IV   Subtalar figure 8     Gait training to improve tolerance for weightbearing and normalize gait pattern x0 mins     Stepping onto scale on Left Lower Extremity able to tolerate 100% body weight   Ambulating with Single crutch x50ft   Stepping over 6" hurdles with single crutch x1   Stepping over 6" hurdles without AD x3laps     Patient Education and Home Exercises     Home Exercises Provided and Patient Education Provided     Education provided:   - Continue HEP   -Transition to walking with crutches at home to allow for some WB    Written Home Exercises Provided: Patient instructed to cont prior HEP. Exercises were reviewed and Morena was able to demonstrate them prior to the end of the session.  Morena demonstrated good  understanding of the education provided. See EMR under Patient Instructions for exercises provided during therapy sessions    Re-ASSESSMENT     Morena has been seen for 4 visits over the past 2 weeks and demonstrates significant improvements in Left ankle range of motion and weight bearing status. She is able to ambulate now without an assistive device. Although improving she continues with decreased Left ankle Range of Motion and lower extremity weakness limiting her ability to perform community ambulation, household chores, squatting, and work duties. She remains a good candidate for skilled outpatient PT to address the above deficits.      Morena Is progressing well towards her goals.   Pt prognosis is Good.     Pt will continue to benefit from " skilled outpatient physical therapy to address the deficits listed in the problem list box on initial evaluation, provide pt/family education and to maximize pt's level of independence in the home and community environment.     Pt's spiritual, cultural and educational needs considered and pt agreeable to plan of care and goals.     Anticipated barriers to physical therapy: Time spent NWB       Goals:  Short Term Goals: 4 weeks   Pt will be IND with initial HEP to manage symptoms outside of PT. MET  Pt will report Left ankle pain </= 0/10 with household ambulation to demonstrate improved condition. progressing  Pt will improve MMT of Left ankle/foot by >/= 1/5 to improve tolerance for ambulation. progressing  Pt will improve Left ankle ROM by >= 5 degrees to improve tolerance for Full weight bearing. MET      Long Term Goals: 10 weeks   Pt will improve FOTO score to </= 42% limited to demonstrate improved functional mobility.  Pt will be IND with final HEP to maintain/improve strength and mobility gained in PT.   Pt will report Left ankle pain </= 0/10 with work duties to demonstrate improved condition.   Pt will improve MMT of BILATERAL LOWER EXTREMITY  to >/= 4/ 5 to improve tolerance for lifting/carrying activities.   Pt will improve Left ankle ROM = to uninvolved side to improve tolerance for squatting.   Pt goal: Pt will report confidence in managing condition upon discharge from PT.        PLAN     Reasons for Recertification of Therapy:   Morena has been seen for 4 visits over the past 2 weeks and demonstrates significant improvements in Left ankle range of motion and weight bearing status. She is able to ambulate now without an assistive device. Although improving she continues with decreased Left ankle Range of Motion and lower extremity weakness limiting her ability to perform community ambulation, household chores, squatting, and work duties. She remains a good candidate for skilled outpatient PT to address  the above deficits.    Updated Certification Period: 11/23/2022 to 01/30/2023  Recommended Treatment Plan: 2 times per week for 6 weeks: Gait Training, Manual Therapy, Moist Heat/ Ice, Neuromuscular Re-ed, Patient Education, Therapeutic Activities, and Therapeutic Exercise  Other Recommendations: Continue plan of care with focus on improving Left ankle Dorsiflexion and weight bearing tolerance to normalize gait.    Jim Lam, PT  Board Certified Clinical Specialist in Orthopedic Physical Therapy    12/7/2022      I CERTIFY THE NEED FOR THESE SERVICES FURNISHED UNDER THIS PLAN OF TREATMENT AND WHILE UNDER MY CARE    Physician's comments:        Physician's Signature: ___________________________________________________

## 2022-12-07 NOTE — PLAN OF CARE
" [facility]   Physical Therapy Treatment Note/Re-Assessment    Name: Morena Paige  Clinic Number: [id number]    Therapy Diagnosis:   Encounter Diagnoses   Name Primary?    Decreased range of motion of left ankle Yes    Weakness of left lower extremity     Impaired functional mobility, balance, gait, and endurance      Physician: Ye Davidson MD    Visit Date: 12/7/2022    Physician Orders: PT Eval and Treat   Medical Diagnosis from Referral: S93.492D (ICD-10-CM) - High ankle sprain, left, subsequent encounter  Evaluation Date: 11/23/2022  Authorization Period Expiration: 11/22/2023  Plan of Care Expiration: 01/30/2023  Progress Note Due: 12/23/2022  Visit # / Visits authorized: 3/4  FOTO: 1/ 3      Return to MD date: prn     Precautions: Standard  PTA Visit #: 0/5     FOTO first follow up:   FOTO second follow up:     Time In: 1402  Time Out: 1457  Total Billable Time: 55 minutes    SUBJECTIVE     Pt reports: walking better, almost tripped on her way in the clinic today    She was compliant with home exercise program.  Response to previous treatment: positive   Functional change: Mobile without AD      Pain: 3/10  Location: left ankles     OBJECTIVE     Active/Passive Range of Motion:   Ankle Right Left   DF (knee extended) 8/10 -10/-8   Plantarflexion 50 50   Inversion 30 20   Eversion 10 5     Full weightbearing    Gait: decreased Dorsiflexion on Left, early heel off    Treatment   All interventions billed as therex per medicaid guidelines     PT technician assisted with treatment under direct supervision of PT     Morena received the treatments listed below:      therapeutic exercises to develop strength, endurance, and ROM for 55 minutes including:     Half kneeling ankle Dorsiflexion x20 with 5s hold with posterior talar glide   Long sitting gastroc stretch 5x15s   Standing gastroc stretch with Left foot on 2" step 5x15s   Shuttle DL press, 3x15 with 75#    Shuttle Single Leg  " "press 3x15 with 50#   Shuttle DL calf press, 3 x 12 with 50#  Box squats to chair with airex 3x10 with 2" step under Left heel   Step ups on 6" step 3x12  Prowler sled push [id number] there and back   Lateral walks with Red Theraband [id number] there and back    Not today:   Seated ABCs, x 2   Lateral weight shifts at mat        manual therapy techniques: Joint mobilizations were applied to the: L ankle for 0 minutes, including     Talocrural AP glide grade III-IV   Subtalar figure 8     Gait training to improve tolerance for weightbearing and normalize gait pattern x0 mins     Stepping onto scale on Left Lower Extremity able to tolerate 100% body weight   Ambulating with Single crutch x50ft   Stepping over 6" hurdles with single crutch x1   Stepping over 6" hurdles without AD x3laps     Patient Education and Home Exercises     Home Exercises Provided and Patient Education Provided     Education provided:   - Continue HEP   -Transition to walking with crutches at home to allow for some WB    Written Home Exercises Provided: Patient instructed to cont prior HEP. Exercises were reviewed and Morena was able to demonstrate them prior to the end of the session.  Morena demonstrated good  understanding of the education provided. See EMR under Patient Instructions for exercises provided during therapy sessions    Re-ASSESSMENT     Morena has been seen for 4 visits over the past 2 weeks and demonstrates significant improvements in Left ankle range of motion and weight bearing status. She is able to ambulate now without an assistive device. Although improving she continues with decreased Left ankle Range of Motion and lower extremity weakness limiting her ability to perform community ambulation, household chores, squatting, and work duties. She remains a good candidate for skilled outpatient PT to address the above deficits.      Morena Is progressing well towards her goals.   Pt prognosis is Good.     Pt will continue to benefit from " skilled outpatient physical therapy to address the deficits listed in the problem list box on initial evaluation, provide pt/family education and to maximize pt's level of independence in the home and community environment.     Pt's spiritual, cultural and educational needs considered and pt agreeable to plan of care and goals.     Anticipated barriers to physical therapy: Time spent NWB       Goals:  Short Term Goals: 4 weeks   Pt will be IND with initial HEP to manage symptoms outside of PT. MET  Pt will report Left ankle pain </= 0/10 with household ambulation to demonstrate improved condition. progressing  Pt will improve MMT of Left ankle/foot by >/= 1/5 to improve tolerance for ambulation. progressing  Pt will improve Left ankle ROM by >= 5 degrees to improve tolerance for Full weight bearing. MET      Long Term Goals: 10 weeks   Pt will improve FOTO score to </= 42% limited to demonstrate improved functional mobility.  Pt will be IND with final HEP to maintain/improve strength and mobility gained in PT.   Pt will report Left ankle pain </= 0/10 with work duties to demonstrate improved condition.   Pt will improve MMT of BILATERAL LOWER EXTREMITY  to >/= 4/ 5 to improve tolerance for lifting/carrying activities.   Pt will improve Left ankle ROM = to uninvolved side to improve tolerance for squatting.   Pt goal: Pt will report confidence in managing condition upon discharge from PT.        PLAN     Reasons for Recertification of Therapy:   Morena has been seen for 4 visits over the past 2 weeks and demonstrates significant improvements in Left ankle range of motion and weight bearing status. She is able to ambulate now without an assistive device. Although improving she continues with decreased Left ankle Range of Motion and lower extremity weakness limiting her ability to perform community ambulation, household chores, squatting, and work duties. She remains a good candidate for skilled outpatient PT to address  the above deficits.    Updated Certification Period: 11/23/2022 to 01/30/2023  Recommended Treatment Plan: 2 times per week for 6 weeks: Gait Training, Manual Therapy, Moist Heat/ Ice, Neuromuscular Re-ed, Patient Education, Therapeutic Activities, and Therapeutic Exercise  Other Recommendations: Continue plan of care with focus on improving Left ankle Dorsiflexion and weight bearing tolerance to normalize gait.    Jim Lam, PT  Board Certified Clinical Specialist in Orthopedic Physical Therapy    12/7/2022      I CERTIFY THE NEED FOR THESE SERVICES FURNISHED UNDER THIS PLAN OF TREATMENT AND WHILE UNDER MY CARE    Physician's comments:        Physician's Signature: ___________________________________________________

## 2022-12-12 ENCOUNTER — CLINICAL SUPPORT (OUTPATIENT)
Dept: REHABILITATION | Facility: HOSPITAL | Age: 22
End: 2022-12-12
Payer: MEDICAID

## 2022-12-12 DIAGNOSIS — M25.672 DECREASED RANGE OF MOTION OF LEFT ANKLE: Primary | ICD-10-CM

## 2022-12-12 DIAGNOSIS — R29.898 WEAKNESS OF LEFT LOWER EXTREMITY: ICD-10-CM

## 2022-12-12 DIAGNOSIS — Z74.09 IMPAIRED FUNCTIONAL MOBILITY, BALANCE, GAIT, AND ENDURANCE: ICD-10-CM

## 2022-12-12 PROCEDURE — 97110 THERAPEUTIC EXERCISES: CPT | Mod: PN,CQ

## 2022-12-12 NOTE — PROGRESS NOTES
"  [facility]   Physical Therapy Treatment Note/Re-Assessment    Name: Morena Paige  Clinic Number: [id number]    Therapy Diagnosis:   Encounter Diagnoses   Name Primary?    Decreased range of motion of left ankle Yes    Weakness of left lower extremity     Impaired functional mobility, balance, gait, and endurance      Physician: Ye Davidson MD    Visit Date: 12/12/2022    Physician Orders: PT Eval and Treat   Medical Diagnosis from Referral: S93.492D (ICD-10-CM) - High ankle sprain, left, subsequent encounter  Evaluation Date: 11/23/2022  Authorization Period Expiration: 11/22/2023  Plan of Care Expiration: 01/30/2023  Progress Note Due: 12/23/2022  Visit # / Visits authorized: 5/5  FOTO: 1/ 3      Return to MD date: prn     Precautions: Standard  PTA Visit #: 1/5     FOTO first follow up:   FOTO second follow up:     Time In: 1300  Time Out: 1400  Total Billable Time: 60 minutes    SUBJECTIVE     Pt reports: doing well. States she felt like     She was compliant with home exercise program.  Response to previous treatment: positive   Functional change: Mobile without AD      Pain: 3/10  Location: left ankles     OBJECTIVE     Treatment   All interventions billed as therex per medicaid guidelines     PT technician assisted with treatment under direct supervision of PT     Morena received the treatments listed below:      therapeutic exercises to develop strength, endurance, and ROM for 60 minutes including:     Half kneeling ankle Dorsiflexion x20 with 5s hold with posterior talar glide   Long sitting gastroc stretch 5x15s   Standing gastroc stretch with Left foot on 2" step 5x15s   Shuttle DL press, 3x15 with 75#    Shuttle Single Leg  press 3x15 with 50#   Shuttle DL calf press, 3 x 12 with 50#  Box squats to chair with airex 3x10 with 2" step under Left heel   Step ups on 6" step 3x12  Prowler sled push [id number] there and back   Lateral walks with Red Theraband [id number] there and " "back  Standing doming, 3 x 10 B   SLB with doming, 3 x 30"   Minisquats with doming,x 10   Not today:   Seated ABCs, x 2   Lateral weight shifts at mat        manual therapy techniques: Joint mobilizations were applied to the: L ankle for 0 minutes, including     Talocrural AP glide grade III-IV   Subtalar figure 8     Gait training to improve tolerance for weightbearing and normalize gait pattern x0 mins     Stepping onto scale on Left Lower Extremity able to tolerate 100% body weight   Ambulating with Single crutch x50ft   Stepping over 6" hurdles with single crutch x1   Stepping over 6" hurdles without AD x3laps     Patient Education and Home Exercises     Home Exercises Provided and Patient Education Provided     Education provided:   - Continue HEP   -Transition to walking with crutches at home to allow for some WB    Written Home Exercises Provided: Patient instructed to cont prior HEP. Exercises were reviewed and Morena was able to demonstrate them prior to the end of the session.  Morena demonstrated good  understanding of the education provided. See EMR under Patient Instructions for exercises provided during therapy sessions    ASSESSMENT     Patient session continued to focus on ankle stability along with DF ROM. Patient progressed in ankle stability exercises as she performed doming WB positions.Pt with good tolerance to therapy session with no adverse effects reported.          Morena Is progressing well towards her goals.   Pt prognosis is Good.     Pt will continue to benefit from skilled outpatient physical therapy to address the deficits listed in the problem list box on initial evaluation, provide pt/family education and to maximize pt's level of independence in the home and community environment.     Pt's spiritual, cultural and educational needs considered and pt agreeable to plan of care and goals.     Anticipated barriers to physical therapy: Time spent NWB       Goals:  Short Term Goals: 4 weeks "   Pt will be IND with initial HEP to manage symptoms outside of PT. MET  Pt will report Left ankle pain </= 0/10 with household ambulation to demonstrate improved condition. progressing  Pt will improve MMT of Left ankle/foot by >/= 1/5 to improve tolerance for ambulation. progressing  Pt will improve Left ankle ROM by >= 5 degrees to improve tolerance for Full weight bearing. MET      Long Term Goals: 10 weeks   Pt will improve FOTO score to </= 42% limited to demonstrate improved functional mobility.  Pt will be IND with final HEP to maintain/improve strength and mobility gained in PT.   Pt will report Left ankle pain </= 0/10 with work duties to demonstrate improved condition.   Pt will improve MMT of BILATERAL LOWER EXTREMITY  to >/= 4/ 5 to improve tolerance for lifting/carrying activities.   Pt will improve Left ankle ROM = to uninvolved side to improve tolerance for squatting.   Pt goal: Pt will report confidence in managing condition upon discharge from PT.        PLAN     Continue to treat and progress per POC and pt tolerance

## 2022-12-14 ENCOUNTER — CLINICAL SUPPORT (OUTPATIENT)
Dept: REHABILITATION | Facility: HOSPITAL | Age: 22
End: 2022-12-14
Payer: MEDICAID

## 2022-12-14 DIAGNOSIS — M25.672 DECREASED RANGE OF MOTION OF LEFT ANKLE: Primary | ICD-10-CM

## 2022-12-14 DIAGNOSIS — R29.898 WEAKNESS OF LEFT LOWER EXTREMITY: ICD-10-CM

## 2022-12-14 DIAGNOSIS — Z74.09 IMPAIRED FUNCTIONAL MOBILITY, BALANCE, GAIT, AND ENDURANCE: ICD-10-CM

## 2022-12-14 PROCEDURE — 97110 THERAPEUTIC EXERCISES: CPT | Mod: PN

## 2022-12-21 ENCOUNTER — CLINICAL SUPPORT (OUTPATIENT)
Dept: REHABILITATION | Facility: HOSPITAL | Age: 22
End: 2022-12-21
Payer: MEDICAID

## 2022-12-21 DIAGNOSIS — Z74.09 IMPAIRED FUNCTIONAL MOBILITY, BALANCE, GAIT, AND ENDURANCE: ICD-10-CM

## 2022-12-21 DIAGNOSIS — R29.898 WEAKNESS OF LEFT LOWER EXTREMITY: ICD-10-CM

## 2022-12-21 DIAGNOSIS — M25.672 DECREASED RANGE OF MOTION OF LEFT ANKLE: Primary | ICD-10-CM

## 2022-12-21 PROCEDURE — 97110 THERAPEUTIC EXERCISES: CPT | Mod: PN,CQ

## 2022-12-21 NOTE — PROGRESS NOTES
"  [facility]   Physical Therapy Treatment Note/Re-Assessment    Name: Morena Paige  Clinic Number: [id number]    Therapy Diagnosis:   Encounter Diagnoses   Name Primary?    Decreased range of motion of left ankle Yes    Weakness of left lower extremity     Impaired functional mobility, balance, gait, and endurance      Physician: Ye Davidson MD    Visit Date: 12/21/2022    Physician Orders: PT Eval and Treat   Medical Diagnosis from Referral: S93.492D (ICD-10-CM) - High ankle sprain, left, subsequent encounter  Evaluation Date: 11/23/2022  Authorization Period Expiration: 02/26/2023  Plan of Care Expiration: 01/30/2023  Progress Note Due: 12/23/2022  Visit # / Visits authorized: 7/5  FOTO: 1/ 3      Return to MD date: prn     Precautions: Standard  PTA Visit #: 1/5     FOTO first follow up:   FOTO second follow up:     Time In: 1200  Time Out: 1300  Total Billable Time: 53 minutes    SUBJECTIVE     Pt reports: "I feel like it is almost back to normal." She reports every now and then if she rolls her ankle it will bother her but doesn't happen often. Reports the last time she had pain was last week when she rolled her ankle on a pine cone. Inquired about when she can return to work.     She was compliant with home exercise program.  Response to previous treatment: positive   Functional change: Mobile without AD      Pain: 0/10  Location: left ankles     OBJECTIVE     Treatment   All interventions billed as therex per medicaid guidelines         Morena received the treatments listed below:      therapeutic exercises to develop strength, endurance, and ROM for 43 minutes including:     Gastroc stretch on slant board 3x1min   Soleus stretch on slant board 3x1min  Standing active Dorsiflexion 3x12  Modified lunges with Left foot in front tapping knee against mat 3x10   Single Leg  balance 3x30s each side   Shuttle DL press, 3x12 with 75#    Shuttle Single Leg  press 3x12 with 50#   Shuttle " "Single Leg  heel raise 3x12 with 25#  Heel raises 3x12  Prowler sled push [id number] there and back   Lateral walks with Red Theraband [id number] there and back  Heel raises on step going into as much dorsiflexion as she can x 20 repetitions     Not today:   Half kneeling ankle Dorsiflexion x20 with 5s hold with posterior talar glide   Shuttle DL calf press, 3 x 12 with 50#  Box squats to chair with airex 3x10 with 2" step under Left heel   Step ups on 6" step 3x12  Standing doming, 3 x 10 B       manual therapy techniques: Joint mobilizations were applied to the: L ankle for 10 minutes, including     Talocrural AP glide grade III-IV   Subtalar figure 8     Gait training to improve tolerance for weightbearing and normalize gait pattern x0 mins     Stepping onto scale on Left Lower Extremity able to tolerate 100% body weight   Ambulating with Single crutch x50ft   Stepping over 6" hurdles with single crutch x1   Stepping over 6" hurdles without AD x3laps     Patient Education and Home Exercises     Home Exercises Provided and Patient Education Provided     Education provided:   - Continue HEP   -Transition to walking with crutches at home to allow for some WB    Written Home Exercises Provided: Patient instructed to cont prior HEP. Exercises were reviewed and Morena was able to demonstrate them prior to the end of the session.  Morena demonstrated good  understanding of the education provided. See EMR under Patient Instructions for exercises provided during therapy sessions    ASSESSMENT     Morena continues to display dorsiflexion limitations evident during tall kneeling and modified lunges exercises. Incorporated body weight heel raises with a stretch into dorsiflexion and instructed patient to perform this at home with in her tolerance. Attempted eccentric heel raises however was unable due to pain. Morena will continue to benefit from skilled Physical Therapy to improve ankle strength and dorsiflexion range of motion to " allow her to return to unrestricted work duty.      Morena Is progressing well towards her goals.   Pt prognosis is Good.     Pt will continue to benefit from skilled outpatient physical therapy to address the deficits listed in the problem list box on initial evaluation, provide pt/family education and to maximize pt's level of independence in the home and community environment.     Pt's spiritual, cultural and educational needs considered and pt agreeable to plan of care and goals.     Anticipated barriers to physical therapy: Time spent NWB       Goals:  Short Term Goals: 4 weeks   Pt will be IND with initial HEP to manage symptoms outside of PT. MET  Pt will report Left ankle pain </= 0/10 with household ambulation to demonstrate improved condition. progressing  Pt will improve MMT of Left ankle/foot by >/= 1/5 to improve tolerance for ambulation. progressing  Pt will improve Left ankle ROM by >= 5 degrees to improve tolerance for Full weight bearing. MET      Long Term Goals: 10 weeks In progress 12/21/2022  Pt will improve FOTO score to </= 42% limited to demonstrate improved functional mobility.  Pt will be IND with final HEP to maintain/improve strength and mobility gained in PT.   Pt will report Left ankle pain </= 0/10 with work duties to demonstrate improved condition.   Pt will improve MMT of BILATERAL LOWER EXTREMITY  to >/= 4/ 5 to improve tolerance for lifting/carrying activities.   Pt will improve Left ankle ROM = to uninvolved side to improve tolerance for squatting.   Pt goal: Pt will report confidence in managing condition upon discharge from PT.        PLAN     Continue to treat and progress per POC and pt tolerance

## 2022-12-28 ENCOUNTER — CLINICAL SUPPORT (OUTPATIENT)
Dept: REHABILITATION | Facility: HOSPITAL | Age: 22
End: 2022-12-28
Payer: MEDICAID

## 2022-12-28 DIAGNOSIS — M25.672 DECREASED RANGE OF MOTION OF LEFT ANKLE: Primary | ICD-10-CM

## 2022-12-28 DIAGNOSIS — Z74.09 IMPAIRED FUNCTIONAL MOBILITY, BALANCE, GAIT, AND ENDURANCE: ICD-10-CM

## 2022-12-28 DIAGNOSIS — R29.898 WEAKNESS OF LEFT LOWER EXTREMITY: ICD-10-CM

## 2022-12-28 PROCEDURE — 97110 THERAPEUTIC EXERCISES: CPT | Mod: PN,CQ

## 2022-12-28 NOTE — PROGRESS NOTES
[facility]   Physical Therapy Treatment Note/Re-Assessment    Name: Morena Paige  Clinic Number: [id number]    Therapy Diagnosis:   Encounter Diagnoses   Name Primary?    Decreased range of motion of left ankle Yes    Weakness of left lower extremity     Impaired functional mobility, balance, gait, and endurance      Physician: Ye Davidson MD    Visit Date: 12/28/2022    Physician Orders: PT Eval and Treat   Medical Diagnosis from Referral: S93.492D (ICD-10-CM) - High ankle sprain, left, subsequent encounter  Evaluation Date: 11/23/2022  Authorization Period Expiration: 02/26/2023  Plan of Care Expiration: 01/30/2023  Progress Note Due: 12/23/2022  Visit # / Visits authorized: 8/5  FOTO: 1/ 3      Return to MD date: prn     Precautions: Standard  PTA Visit #: 1/5     FOTO first follow up: 12/28/22  FOTO second follow up:     Time In: 1400  Time Out: 1500  Total Billable Time: 53 minutes    SUBJECTIVE     Pt reports: She [place] the mall on Monday and spent 5 hours on her feet. Reports she was a little sore afterwards but only lasted approx 30 minutes. She also slipped when getting out of the tub this morning. Reports she felt a sharp pain initially but no lingering pain. Arrives to clinic without symptomology.    She was compliant with home exercise program.  Response to previous treatment: positive   Functional change: Mobile without AD      Pain: 0/10  Location: left ankles     OBJECTIVE     Treatment   All interventions billed as therex per medicaid guidelines         Morena received the treatments listed below:      therapeutic exercises to develop strength, endurance, and ROM for 43 minutes including:     Gastroc stretch on slant board 3x1min   Soleus stretch on slant board 3x1min  Standing active Dorsiflexion 3x12  Modified lunges with Left foot in front tapping knee against mat 3x10   Single Leg  balance 3x30s each side   Shuttle DL press, 3x12 with 112#    Shuttle Single Leg   "press 3x12 with 75#   Shuttle Single Leg  heel raise 3x12 with 50#  Heel raises 3x12  Prowler sled push [id number] there and back   Lateral walks with yellow theraband around forefoot [id number] there and back  Heel raises on step going into as much dorsiflexion as she can x 20 repetitions   Eccentric heel raises 2 x 15 repetitions   Single leg heel raises x 20 repetitions with upper extremity support   Tandem stepping with heel raises x 30 yards     Not today:   Half kneeling ankle Dorsiflexion x20 with 5s hold with posterior talar glide   Shuttle DL calf press, 3 x 12 with 50#  Box squats to chair with airex 3x10 with 2" step under Left heel   Step ups on 6" step 3x12  Standing doming, 3 x 10 B       manual therapy techniques: Joint mobilizations were applied to the: L ankle for 10 minutes, including     Talocrural AP glide grade III-IV   Subtalar figure 8     Gait training to improve tolerance for weightbearing and normalize gait pattern x0 mins     Stepping onto scale on Left Lower Extremity able to tolerate 100% body weight   Ambulating with Single crutch x50ft   Stepping over 6" hurdles with single crutch x1   Stepping over 6" hurdles without AD x3laps     Patient Education and Home Exercises     Home Exercises Provided and Patient Education Provided     Education provided:   - Continue HEP   -Transition to walking with crutches at home to allow for some WB    Written Home Exercises Provided: Patient instructed to cont prior HEP. Exercises were reviewed and Morena was able to demonstrate them prior to the end of the session.  Morena demonstrated good  understanding of the education provided. See EMR under Patient Instructions for exercises provided during therapy sessions    ASSESSMENT     Morena notes with much improved tolerance to weight bearing exercises this date. She was able to perform eccentric and single heel raises without symptomology. Performed lateral stepping this date with theraband resisting eversion " without onset of symptomology. Her FOTO scores shows significant improvement with 25% limitations meeting long term goal. Morena continues to lack dorsiflexion range of motion on left lower extremity and will continue to benefit from skilled Physical Therapy to maximize strength and range of motion gains to allow her to return to age appropriate recreational activities.     Morena Is progressing well towards her goals.   Pt prognosis is Good.     Pt will continue to benefit from skilled outpatient physical therapy to address the deficits listed in the problem list box on initial evaluation, provide pt/family education and to maximize pt's level of independence in the home and community environment.     Pt's spiritual, cultural and educational needs considered and pt agreeable to plan of care and goals.     Anticipated barriers to physical therapy: Time spent NWB       Goals:  Short Term Goals: 4 weeks   Pt will be IND with initial HEP to manage symptoms outside of PT. MET  Pt will report Left ankle pain </= 0/10 with household ambulation to demonstrate improved condition. progressing  Pt will improve MMT of Left ankle/foot by >/= 1/5 to improve tolerance for ambulation. progressing  Pt will improve Left ankle ROM by >= 5 degrees to improve tolerance for Full weight bearing. MET      Long Term Goals: 10 weeks In progress 12/28/2022  Pt will improve FOTO score to </= 42% limited to demonstrate improved functional mobility. MET  Pt will be IND with final HEP to maintain/improve strength and mobility gained in PT.   Pt will report Left ankle pain </= 0/10 with work duties to demonstrate improved condition.   Pt will improve MMT of BILATERAL LOWER EXTREMITY  to >/= 4/ 5 to improve tolerance for lifting/carrying activities.   Pt will improve Left ankle ROM = to uninvolved side to improve tolerance for squatting.   Pt goal: Pt will report confidence in managing condition upon discharge from PT.        PLAN     Continue to  treat and progress per POC and pt tolerance

## 2023-01-04 ENCOUNTER — CLINICAL SUPPORT (OUTPATIENT)
Dept: REHABILITATION | Facility: HOSPITAL | Age: 23
End: 2023-01-04
Payer: MEDICAID

## 2023-01-04 DIAGNOSIS — R29.898 WEAKNESS OF LEFT LOWER EXTREMITY: ICD-10-CM

## 2023-01-04 DIAGNOSIS — Z74.09 IMPAIRED FUNCTIONAL MOBILITY, BALANCE, GAIT, AND ENDURANCE: ICD-10-CM

## 2023-01-04 DIAGNOSIS — M25.672 DECREASED RANGE OF MOTION OF LEFT ANKLE: Primary | ICD-10-CM

## 2023-01-04 PROCEDURE — 97110 THERAPEUTIC EXERCISES: CPT | Mod: PN

## 2023-01-04 NOTE — PROGRESS NOTES
[facility]   Physical Therapy Treatment Note/Re-Assessment    Name: Morena Paige  Clinic Number: [id number]    Therapy Diagnosis:   Encounter Diagnoses   Name Primary?    Decreased range of motion of left ankle Yes    Weakness of left lower extremity     Impaired functional mobility, balance, gait, and endurance        Physician: Ye Davidson MD    Visit Date: 1/4/2023    Physician Orders: PT Eval and Treat   Medical Diagnosis from Referral: S93.492D (ICD-10-CM) - High ankle sprain, left, subsequent encounter  Evaluation Date: 11/23/2022  Authorization Period Expiration: 02/26/2023  Plan of Care Expiration: 01/30/2023  Progress Note Due: 12/23/2022  Visit # / Visits authorized: 9/5  FOTO: 1/ 3      Return to MD date: prn     Precautions: Standard  PTA Visit #: 1/5     FOTO first follow up: 12/28/22  FOTO second follow up:     Time In: 100p  Time Out: 150p  Total Billable Time: 50 minutes    SUBJECTIVE     Pt reports: Morena states that she felt well after leaving last session. She also mentioned that she is doing well today and does not have any symptoms at this time. She has not been able to return to work due to not having a doctors note at this time. Morena has been compliant with HEP    She was compliant with home exercise program.  Response to previous treatment: positive   Functional change: Mobile without AD      Pain: 0/10  Location: left ankles     OBJECTIVE     Treatment   All interventions billed as therex per medicaid guidelines     Assessment:    ROM - DF: 5 degrees B    DL Heel raise: symmetrical and pain-free   SL Heel raise:  symmetrical and pain-free; 15 max reps on the L; 20 max reps on the R     Morena received the treatments listed below:      therapeutic exercises to develop strength, endurance, and ROM for 40 minutes including:     Assessment as above   Ankle DF self-mob 5 x 30; 5s holds    SL Bridge 3 x 10   SL clamshells 3 x 10   Shuttle DL Leg press 3 x 10 75#  "  SL balance on foam pad 3 x 30              Not today:  Modified lunges with Left foot in front tapping knee against mat 3x10    Half kneeling ankle Dorsiflexion x20 with 5s hold with posterior talar glide   Shuttle DL calf press, 3 x 12 with 50#  Box squats to chair with airex 3x10 with 2" step under Left heel   Step ups on 6" step 3x12  Standing doming, 3 x 10 B   Gastroc stretch on slant board 3x1min   Soleus stretch on slant board 3x1min  Prowler sled push [id number] there and back   Lateral walks with yellow theraband around forefoot [id number] there and back    manual therapy techniques: Joint mobilizations were applied to the: L ankle for 10 minutes, including     Talocrural Distraction  Talocrural AP glide grade III-IV         Patient Education and Home Exercises     Home Exercises Provided and Patient Education Provided     Education provided:   - Continue HEP       Written Home Exercises Provided: Patient instructed to cont prior HEP. Exercises were reviewed and Morena was able to demonstrate them prior to the end of the session.  Morena demonstrated good  understanding of the education provided. See EMR under Patient Instructions for exercises provided during therapy sessions    ASSESSMENT     Morena notes that she has not had any symptoms lately. At this time Morena continues to lack dorsiflexion range of motion on left lower extremity, although symmetrical to the other side. Her main limitation is decreased gastroc-soleus length in her L ankle. Morena can continue to benefit from skilled Physical Therapy to maximize range of motion gains to allow her to return to her prior activities.     Morena Is progressing well towards her goals.   Pt prognosis is Good.     Pt will continue to benefit from skilled outpatient physical therapy to address the deficits listed in the problem list box on initial evaluation, provide pt/family education and to maximize pt's level of independence in the home and community environment. "     Pt's spiritual, cultural and educational needs considered and pt agreeable to plan of care and goals.     Anticipated barriers to physical therapy: Time spent NWB       Goals:  Short Term Goals: 4 weeks   Pt will be IND with initial HEP to manage symptoms outside of PT. MET  Pt will report Left ankle pain </= 0/10 with household ambulation to demonstrate improved condition. progressing  Pt will improve MMT of Left ankle/foot by >/= 1/5 to improve tolerance for ambulation. progressing  Pt will improve Left ankle ROM by >= 5 degrees to improve tolerance for Full weight bearing. MET      Long Term Goals: 10 weeks In progress 1/4/2023  Pt will improve FOTO score to </= 42% limited to demonstrate improved functional mobility. MET  Pt will be IND with final HEP to maintain/improve strength and mobility gained in PT.   Pt will report Left ankle pain </= 0/10 with work duties to demonstrate improved condition.   Pt will improve MMT of BILATERAL LOWER EXTREMITY  to >/= 4/ 5 to improve tolerance for lifting/carrying activities.   Pt will improve Left ankle ROM = to uninvolved side to improve tolerance for squatting.   Pt goal: Pt will report confidence in managing condition upon discharge from PT.        PLAN     Continue to treat and progress per POC and pt tolerance       Edward AMBRIZ    I attest that I was present for the entirety of the treatment and agree to the assessment mentioned above.        Yared Mcclure PT, DPT
